# Patient Record
Sex: FEMALE | Race: WHITE | NOT HISPANIC OR LATINO | Employment: FULL TIME | URBAN - METROPOLITAN AREA
[De-identification: names, ages, dates, MRNs, and addresses within clinical notes are randomized per-mention and may not be internally consistent; named-entity substitution may affect disease eponyms.]

---
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
## 2018-06-05 ENCOUNTER — OFFICE VISIT (OUTPATIENT)
Dept: FAMILY MEDICINE CLINIC | Facility: CLINIC | Age: 18
End: 2018-06-05

## 2018-06-05 VITALS
TEMPERATURE: 98.5 F | HEART RATE: 97 BPM | OXYGEN SATURATION: 97 % | DIASTOLIC BLOOD PRESSURE: 70 MMHG | SYSTOLIC BLOOD PRESSURE: 110 MMHG | RESPIRATION RATE: 18 BRPM

## 2018-06-05 DIAGNOSIS — L70.0 ACNE VULGARIS: Primary | ICD-10-CM

## 2018-06-05 PROCEDURE — 99213 OFFICE O/P EST LOW 20 MIN: CPT | Performed by: INTERNAL MEDICINE

## 2018-06-05 RX ORDER — NORGESTIMATE AND ETHINYL ESTRADIOL 7DAYSX3 28
1 KIT ORAL DAILY
Qty: 28 TABLET | Refills: 12 | Status: SHIPPED | OUTPATIENT
Start: 2018-06-05 | End: 2019-04-23 | Stop reason: SDUPTHER

## 2018-11-21 ENCOUNTER — TELEPHONE (OUTPATIENT)
Dept: FAMILY MEDICINE CLINIC | Facility: CLINIC | Age: 18
End: 2018-11-21

## 2018-11-21 ENCOUNTER — OFFICE VISIT (OUTPATIENT)
Dept: FAMILY MEDICINE CLINIC | Facility: CLINIC | Age: 18
End: 2018-11-21

## 2018-11-21 VITALS
BODY MASS INDEX: 27.73 KG/M2 | OXYGEN SATURATION: 98 % | DIASTOLIC BLOOD PRESSURE: 70 MMHG | HEART RATE: 86 BPM | WEIGHT: 182.4 LBS | SYSTOLIC BLOOD PRESSURE: 124 MMHG | RESPIRATION RATE: 16 BRPM | TEMPERATURE: 98 F

## 2018-11-21 DIAGNOSIS — F41.9 ANXIETY: ICD-10-CM

## 2018-11-21 DIAGNOSIS — R63.5 WEIGHT GAIN: Primary | ICD-10-CM

## 2018-11-21 DIAGNOSIS — L50.9 URTICARIA: ICD-10-CM

## 2018-11-21 DIAGNOSIS — L70.0 ACNE VULGARIS: ICD-10-CM

## 2018-11-21 DIAGNOSIS — L67.8: ICD-10-CM

## 2018-11-21 PROCEDURE — 99213 OFFICE O/P EST LOW 20 MIN: CPT | Performed by: INTERNAL MEDICINE

## 2018-11-21 RX ORDER — PAROXETINE HYDROCHLORIDE HEMIHYDRATE 12.5 MG/1
12.5 TABLET, FILM COATED, EXTENDED RELEASE ORAL EVERY MORNING
Qty: 30 TABLET | Refills: 3 | Status: SHIPPED | OUTPATIENT
Start: 2018-11-21 | End: 2019-01-14 | Stop reason: SDUPTHER

## 2018-11-21 NOTE — PROGRESS NOTES
Subjective   Amy Brown is a 18 y.o. female who comes in today for   Chief Complaint   Patient presents with   • Itching   .    History of Present Illness   Mom here with pt who c/o hives at night when she is stressed and upset.  Pictures show red wheels which resemble urticaria.  Only occurs after a stressful event usually with her roommates.  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  +weight gain.  Acne and some hair growth adjacent to her ears bilaterally.  Mom is also concerned as is the patient about increase in anxiety.  She has been treated for anxiety and depression throughout her life but has never done well on any of them.  She has tried Zoloft, Celexa, Lexapro.  She has never tried Paxil.  Her periods are regular but she is on the birth control pill she has not had her first GYN exam.  There is a very strong family history in her mom grandmother and aunts have thyroid disease.  The following portions of the patient's history were reviewed and updated as appropriate: allergies, current medications, past family history, past medical history, past social history, past surgical history and problem list.    Review of Systems   Constitutional: Positive for unexpected weight change.   Skin: Positive for rash.   Psychiatric/Behavioral: The patient is nervous/anxious.        Vitals:    11/21/18 1100   BP: 124/70   Pulse: 86   Resp: 16   Temp: 98 °F (36.7 °C)   SpO2: 98%       Objective   Physical Exam   Constitutional: She is oriented to person, place, and time. She appears well-developed and well-nourished.   HENT:   Head: Normocephalic and atraumatic.   Right Ear: External ear normal.   Left Ear: External ear normal.   Mouth/Throat: Oropharynx is clear and moist.   Eyes: Conjunctivae are normal.   Neck: Neck supple.   Cardiovascular: Normal rate, regular rhythm and normal heart sounds.   No bruits   Pulmonary/Chest: Effort normal and breath sounds normal. No respiratory distress. She has no wheezes. She has no rales.    Abdominal: Soft. Bowel sounds are normal. She exhibits no distension and no mass. There is no tenderness.   Lymphadenopathy:     She has no cervical adenopathy.   Neurological: She is alert and oriented to person, place, and time.   Skin: Skin is warm.   Psychiatric: She has a normal mood and affect. Her behavior is normal. Judgment and thought content normal.   Nursing note and vitals reviewed.        Current Outpatient Medications:   •  norgestimate-ethinyl estradiol (ORTHO TRI-CYCLEN, 28,) 0.18/0.215/0.25 MG-35 MCG per tablet, Take 1 tablet by mouth Daily., Disp: 28 tablet, Rfl: 12  •  PARoxetine CR (PAXIL CR) 12.5 MG 24 hr tablet, Take 1 tablet by mouth Every Morning., Disp: 30 tablet, Rfl: 3    Assessment/Plan   Amy was seen today for itching.    Diagnoses and all orders for this visit:    Weight gain  -     T3, Free  -     T4, Free  -     TSH  -     Comprehensive Metabolic Panel  -     CBC & Differential  -     Testosterone  -     Hemoglobin A1c    Urticaria  -     T3, Free  -     T4, Free  -     TSH  -     Comprehensive Metabolic Panel  -     CBC & Differential  -     Testosterone  -     Hemoglobin A1c    Anxiety  -     T3, Free  -     T4, Free  -     TSH  -     Comprehensive Metabolic Panel  -     CBC & Differential  -     Testosterone  -     Hemoglobin A1c    Acne vulgaris  -     T3, Free  -     T4, Free  -     TSH  -     Comprehensive Metabolic Panel  -     CBC & Differential  -     Testosterone  -     Hemoglobin A1c    Abnormal hair growth from preauricular area towards lateral cheekbone  -     T3, Free  -     T4, Free  -     TSH  -     Comprehensive Metabolic Panel  -     CBC & Differential  -     Testosterone  -     Hemoglobin A1c    Other orders  -     PARoxetine CR (PAXIL CR) 12.5 MG 24 hr tablet; Take 1 tablet by mouth Every Morning.      Due to the acne, weight gain, abnormal hair growth I am going to do some lab work to screen for PCOS  I have started Paxil CR 12.5 once daily and advised her  on how to take this and any potential side effects.  We discussed that weight loss would actually improve her glucose control if that is indeed an issue.  I said a lot of time counseling her on ways to handle stress and actually encouraged her to see a counselor.  She declines at this time.  I will see her back in 8 weeks             I have asked for the patient to return to clinic in 6month(s).

## 2018-11-22 LAB
ALBUMIN SERPL-MCNC: 4.1 G/DL (ref 3.5–5.2)
ALBUMIN/GLOB SERPL: 1.2 G/DL
ALP SERPL-CCNC: 78 U/L (ref 39–117)
ALT SERPL-CCNC: 14 U/L (ref 1–33)
AST SERPL-CCNC: 18 U/L (ref 1–32)
BASOPHILS # BLD AUTO: 0.04 10*3/MM3 (ref 0–0.2)
BASOPHILS NFR BLD AUTO: 0.7 % (ref 0–1.5)
BILIRUB SERPL-MCNC: 0.3 MG/DL (ref 0.1–1.2)
BUN SERPL-MCNC: 8 MG/DL (ref 6–20)
BUN/CREAT SERPL: 10 (ref 7–25)
CALCIUM SERPL-MCNC: 9.7 MG/DL (ref 8.6–10.5)
CHLORIDE SERPL-SCNC: 103 MMOL/L (ref 98–107)
CO2 SERPL-SCNC: 22 MMOL/L (ref 22–29)
CREAT SERPL-MCNC: 0.8 MG/DL (ref 0.57–1)
EOSINOPHIL # BLD AUTO: 0.19 10*3/MM3 (ref 0–0.7)
EOSINOPHIL NFR BLD AUTO: 3.2 % (ref 0.3–6.2)
ERYTHROCYTE [DISTWIDTH] IN BLOOD BY AUTOMATED COUNT: 13.6 % (ref 11.7–13)
GLOBULIN SER CALC-MCNC: 3.5 GM/DL
GLUCOSE SERPL-MCNC: 88 MG/DL (ref 65–99)
HBA1C MFR BLD: 5.3 % (ref 4.8–5.6)
HCT VFR BLD AUTO: 40.8 % (ref 35.6–45.5)
HGB BLD-MCNC: 13.3 G/DL (ref 11.9–15.5)
IMM GRANULOCYTES # BLD: 0 10*3/MM3 (ref 0–0.03)
IMM GRANULOCYTES NFR BLD: 0 % (ref 0–0.5)
LYMPHOCYTES # BLD AUTO: 1.97 10*3/MM3 (ref 0.9–4.8)
LYMPHOCYTES NFR BLD AUTO: 32.8 % (ref 19.6–45.3)
MCH RBC QN AUTO: 30.2 PG (ref 26.9–32)
MCHC RBC AUTO-ENTMCNC: 32.6 G/DL (ref 32.4–36.3)
MCV RBC AUTO: 92.5 FL (ref 80.5–98.2)
MONOCYTES # BLD AUTO: 0.56 10*3/MM3 (ref 0.2–1.2)
MONOCYTES NFR BLD AUTO: 9.3 % (ref 5–12)
NEUTROPHILS # BLD AUTO: 3.24 10*3/MM3 (ref 1.9–8.1)
NEUTROPHILS NFR BLD AUTO: 54 % (ref 42.7–76)
PLATELET # BLD AUTO: 368 10*3/MM3 (ref 140–500)
POTASSIUM SERPL-SCNC: 4.5 MMOL/L (ref 3.5–5.2)
PROT SERPL-MCNC: 7.6 G/DL (ref 6–8.5)
RBC # BLD AUTO: 4.41 10*6/MM3 (ref 3.9–5.2)
SODIUM SERPL-SCNC: 141 MMOL/L (ref 136–145)
T3FREE SERPL-MCNC: 3.5 PG/ML (ref 2–4.4)
T4 FREE SERPL-MCNC: 1.06 NG/DL (ref 0.93–1.7)
TESTOST SERPL-MCNC: 28 NG/DL (ref 8–48)
TSH SERPL DL<=0.005 MIU/L-ACNC: 2.16 MIU/ML (ref 0.27–4.2)
WBC # BLD AUTO: 6 10*3/MM3 (ref 4.5–10.7)

## 2018-11-26 DIAGNOSIS — L67.8: ICD-10-CM

## 2018-11-26 DIAGNOSIS — R63.5 WEIGHT GAIN: Primary | ICD-10-CM

## 2019-01-02 ENCOUNTER — TELEPHONE (OUTPATIENT)
Dept: FAMILY MEDICINE CLINIC | Facility: CLINIC | Age: 19
End: 2019-01-02

## 2019-01-02 NOTE — TELEPHONE ENCOUNTER
Johanna from referred office states patient cancelled appt. With Dr. Finch And states she does not want to follow up with specialist at this time.

## 2019-01-09 ENCOUNTER — OFFICE VISIT (OUTPATIENT)
Dept: FAMILY MEDICINE CLINIC | Facility: CLINIC | Age: 19
End: 2019-01-09

## 2019-01-09 VITALS
OXYGEN SATURATION: 99 % | DIASTOLIC BLOOD PRESSURE: 68 MMHG | SYSTOLIC BLOOD PRESSURE: 120 MMHG | TEMPERATURE: 98.7 F | RESPIRATION RATE: 18 BRPM | WEIGHT: 182 LBS | HEART RATE: 83 BPM | BODY MASS INDEX: 29.25 KG/M2 | HEIGHT: 66 IN

## 2019-01-09 DIAGNOSIS — F41.9 ANXIETY: Primary | ICD-10-CM

## 2019-01-09 PROCEDURE — 99213 OFFICE O/P EST LOW 20 MIN: CPT | Performed by: INTERNAL MEDICINE

## 2019-01-09 NOTE — PROGRESS NOTES
Subjective   Amy Brown is a 18 y.o. female who comes in today for   Chief Complaint   Patient presents with   • Weight Gain     8 week follow up   .    History of Present Illness   Here for f/u on weight gain and hives.  Started paxil CR 12.5 mg qd.  Hives have stopped.  Only had one day of hives and that was her first day of working peak season for [employer].  Sleeping well.  Thinks her mood is more elevated and she is happier.  Feels like overall her anxiety has gotten a lot less.  Goes back to school next week.  Roommate situation is better as well.      The following portions of the patient's history were reviewed and updated as appropriate: allergies, current medications, past family history, past medical history, past social history, past surgical history and problem list.    Review of Systems   Constitutional: Negative.    Skin: Negative for rash.   Psychiatric/Behavioral: Negative.        Vitals:    01/09/19 1145   BP: 120/68   Pulse: 83   Resp: 18   Temp: 98.7 °F (37.1 °C)   SpO2: 99%       Objective   Physical Exam   Constitutional: She is oriented to person, place, and time. She appears well-developed and well-nourished.   HENT:   Head: Normocephalic and atraumatic.   Right Ear: External ear normal.   Left Ear: External ear normal.   Mouth/Throat: Oropharynx is clear and moist.   Eyes: Conjunctivae are normal.   Neck: Neck supple.   Cardiovascular: Normal rate, regular rhythm and normal heart sounds.   No bruits   Pulmonary/Chest: Effort normal and breath sounds normal. No respiratory distress. She has no wheezes. She has no rales.   Lymphadenopathy:     She has no cervical adenopathy.   Neurological: She is alert and oriented to person, place, and time.   Skin: Skin is warm. Capillary refill takes less than 2 seconds.   Psychiatric: She has a normal mood and affect. Her behavior is normal. Judgment and thought content normal.   Nursing note and vitals reviewed.        Current Outpatient Medications:   •   norgestimate-ethinyl estradiol (ORTHO TRI-CYCLEN, 28,) 0.18/0.215/0.25 MG-35 MCG per tablet, Take 1 tablet by mouth Daily., Disp: 28 tablet, Rfl: 12  •  PARoxetine CR (PAXIL CR) 12.5 MG 24 hr tablet, Take 1 tablet by mouth Every Morning., Disp: 30 tablet, Rfl: 3    Assessment/Plan   Amy was seen today for weight gain.    Diagnoses and all orders for this visit:    Anxiety      Continue paxil CR 12. 5mg qd. Working well and anxiety well controlled. No side effects.  Recheck in 6-9 mo             I have asked for the patient to return to clinic in 6month(s).

## 2019-01-14 ENCOUNTER — TELEPHONE (OUTPATIENT)
Dept: FAMILY MEDICINE CLINIC | Facility: CLINIC | Age: 19
End: 2019-01-14

## 2019-01-14 RX ORDER — PAROXETINE HYDROCHLORIDE HEMIHYDRATE 12.5 MG/1
12.5 TABLET, FILM COATED, EXTENDED RELEASE ORAL EVERY MORNING
Qty: 90 TABLET | Refills: 1 | Status: SHIPPED | OUTPATIENT
Start: 2019-01-14 | End: 2019-07-12 | Stop reason: SDUPTHER

## 2019-04-23 RX ORDER — NORGESTIMATE AND ETHINYL ESTRADIOL
KIT
Qty: 28 TABLET | Refills: 3 | Status: SHIPPED | OUTPATIENT
Start: 2019-04-23 | End: 2019-07-14 | Stop reason: SDUPTHER

## 2019-04-24 ENCOUNTER — TELEPHONE (OUTPATIENT)
Dept: FAMILY MEDICINE CLINIC | Facility: CLINIC | Age: 19
End: 2019-04-24

## 2019-04-24 NOTE — TELEPHONE ENCOUNTER
Pt has appointment 7/10/2019 Can she just have pap spear during that appointment? Or do you need pap spear sooner?

## 2019-04-25 NOTE — TELEPHONE ENCOUNTER
Pap smear in July is perfect!  As long as we get her pap smear sometime between her turning 19 and the end of the year it is fine

## 2019-07-15 RX ORDER — PAROXETINE HYDROCHLORIDE HEMIHYDRATE 12.5 MG/1
TABLET, FILM COATED, EXTENDED RELEASE ORAL
Qty: 90 TABLET | Refills: 1 | Status: SHIPPED | OUTPATIENT
Start: 2019-07-15 | End: 2023-02-08

## 2019-07-16 ENCOUNTER — OFFICE VISIT (OUTPATIENT)
Dept: FAMILY MEDICINE CLINIC | Facility: CLINIC | Age: 19
End: 2019-07-16

## 2019-07-16 VITALS
TEMPERATURE: 98.1 F | DIASTOLIC BLOOD PRESSURE: 84 MMHG | BODY MASS INDEX: 29.63 KG/M2 | WEIGHT: 184.4 LBS | HEART RATE: 71 BPM | OXYGEN SATURATION: 97 % | HEIGHT: 66 IN | SYSTOLIC BLOOD PRESSURE: 112 MMHG

## 2019-07-16 DIAGNOSIS — Z30.9 ENCOUNTER FOR CONTRACEPTIVE MANAGEMENT, UNSPECIFIED TYPE: ICD-10-CM

## 2019-07-16 DIAGNOSIS — F41.9 ANXIETY: Primary | ICD-10-CM

## 2019-07-16 PROCEDURE — 99213 OFFICE O/P EST LOW 20 MIN: CPT | Performed by: INTERNAL MEDICINE

## 2019-07-16 RX ORDER — NORGESTIMATE AND ETHINYL ESTRADIOL 7DAYSX3 28
1 KIT ORAL DAILY
Qty: 84 TABLET | Refills: 0 | Status: SHIPPED | OUTPATIENT
Start: 2019-07-16 | End: 2020-03-04

## 2019-07-16 RX ORDER — NORGESTIMATE AND ETHINYL ESTRADIOL 7DAYSX3 28
KIT ORAL
Qty: 84 TABLET | Refills: 1 | Status: SHIPPED | OUTPATIENT
Start: 2019-07-16 | End: 2019-07-16 | Stop reason: SDUPTHER

## 2019-07-16 RX ORDER — TRETINOIN 0.6 MG/G
GEL TOPICAL
COMMUNITY
Start: 2019-06-14

## 2019-07-16 NOTE — PROGRESS NOTES
Subjective   Amy Brown is a 19 y.o. female who comes in today for   Chief Complaint   Patient presents with   • Anxiety   .    History of Present Illness   Here for f/u on anxiety.  She is a rising senior at [employer].  Taking a summer class and working at [employer] as a .  Taking paxil CR 12.5 mg qd and it is helping.  Overall feels less anxious in the morning about things that usually upset her.  Helps her have reason and logic.  More engaged.  Has some days where she has low motivation.  That usually occurs on her offdays.  Hasn't had her first pap smear and prefers to come back in 3 mo for it  Cycles are regular.  Currently is not sexually active.    She needs refill on her BCP that she has previously started but had to stop it 3 mo ago b/c had run out of refills.  The following portions of the patient's history were reviewed and updated as appropriate: allergies, current medications, past family history, past medical history, past social history, past surgical history and problem list.    Review of Systems   Constitutional: Negative.    Psychiatric/Behavioral: Negative.        Vitals:    07/16/19 1055   BP: 112/84   Pulse: 71   Temp: 98.1 °F (36.7 °C)   SpO2: 97%       Objective   Physical Exam   Constitutional: She is oriented to person, place, and time. She appears well-developed and well-nourished.   HENT:   Head: Normocephalic and atraumatic.   Right Ear: External ear normal.   Left Ear: External ear normal.   Mouth/Throat: Oropharynx is clear and moist.   Eyes: Conjunctivae are normal.   Neck: Neck supple.   Cardiovascular: Normal rate, regular rhythm and normal heart sounds.   No bruits   Pulmonary/Chest: Effort normal and breath sounds normal. No respiratory distress. She has no wheezes. She has no rales.   Abdominal: Soft. Bowel sounds are normal. She exhibits no distension and no mass. There is no tenderness.   Lymphadenopathy:     She has no cervical adenopathy.   Neurological: She is alert and  oriented to person, place, and time.   Skin: Skin is warm.   Psychiatric: She has a normal mood and affect. Her behavior is normal. Judgment and thought content normal.   Nursing note and vitals reviewed.        Current Outpatient Medications:   •  norgestimate-ethinyl estradiol (TRI FEMYNOR) 0.18/0.215/0.25 MG-35 MCG per tablet, Take 1 tablet by mouth Daily., Disp: 84 tablet, Rfl: 0  •  PARoxetine CR (PAXIL-CR) 12.5 MG 24 hr tablet, TAKE 1 TABLET BY MOUTH EVERY DAY IN THE MORNING, Disp: 90 tablet, Rfl: 1  •  RETIN-A MICRO PUMP 0.06 % gel, , Disp: , Rfl:     Assessment/Plan   Amy was seen today for anxiety.    Diagnoses and all orders for this visit:    Anxiety    Encounter for contraceptive management, unspecified type    Other orders  -     norgestimate-ethinyl estradiol (TRI FEMYNOR) 0.18/0.215/0.25 MG-35 MCG per tablet; Take 1 tablet by mouth Daily.      Continue paxil CR 12.5 mg qd.  Tolerating well and feeling better  Reschedule for pap in 3 mo  Refill BCP             I have asked for the patient to return to clinic in 6month(s).

## 2020-03-04 RX ORDER — NORGESTIMATE AND ETHINYL ESTRADIOL 7DAYSX3 28
KIT ORAL
Qty: 84 TABLET | Refills: 0 | Status: SHIPPED | OUTPATIENT
Start: 2020-03-04 | End: 2020-05-26

## 2020-05-26 RX ORDER — NORGESTIMATE AND ETHINYL ESTRADIOL 7DAYSX3 28
KIT ORAL
Qty: 84 TABLET | Refills: 0 | Status: SHIPPED | OUTPATIENT
Start: 2020-05-26 | End: 2020-08-13

## 2020-06-22 ENCOUNTER — HOSPITAL ENCOUNTER (OUTPATIENT)
Dept: ULTRASOUND IMAGING | Facility: HOSPITAL | Age: 20
Discharge: HOME | End: 2020-06-22
Admitting: FAMILY MEDICINE

## 2020-06-22 ENCOUNTER — OFFICE VISIT (OUTPATIENT)
Dept: FAMILY MEDICINE CLINIC | Facility: CLINIC | Age: 20
End: 2020-06-22

## 2020-06-22 VITALS
DIASTOLIC BLOOD PRESSURE: 90 MMHG | HEIGHT: 66 IN | SYSTOLIC BLOOD PRESSURE: 116 MMHG | BODY MASS INDEX: 29.41 KG/M2 | OXYGEN SATURATION: 96 % | WEIGHT: 183 LBS | TEMPERATURE: 98.4 F | HEART RATE: 84 BPM

## 2020-06-22 DIAGNOSIS — R10.33 PERIUMBILICAL ABDOMINAL PAIN: Primary | ICD-10-CM

## 2020-06-22 DIAGNOSIS — R10.13 EPIGASTRIC PAIN: ICD-10-CM

## 2020-06-22 DIAGNOSIS — R93.5 ABDOMINAL ULTRASOUND, ABNORMAL: ICD-10-CM

## 2020-06-22 PROCEDURE — 99214 OFFICE O/P EST MOD 30 MIN: CPT | Performed by: FAMILY MEDICINE

## 2020-06-22 PROCEDURE — 76700 US EXAM ABDOM COMPLETE: CPT

## 2020-06-22 NOTE — PROGRESS NOTES
Subjective   Amy Brown is a 20 y.o. female.     Chief Complaint   Patient presents with   • Hernia     C/O OF PAIN AROUND UMBILICAL, HX OF HERNIAS, LIFTS AT WORK AND THINKS SHE MAY HAVE RUPTURED IT       History of Present Illness Amy Sweet a 20-year-old female patient came here with 1 day history of acute onset abdominal pain.  Patient works at [employer] and usually picks up heavy boxes.  Was moving some boxes yesterday and noticed something moved in her abdomen followed by pain.  It is describing as a constant pressure.  Also noticed a knot in her upper abdomen.  Had a bowel movement this morning denies any nausea vomiting.  Denies any fever.    Past Medical History:   Diagnosis Date   • ASD (atrial septal defect)     closed on its own at 1 year of age   • Hiatal hernia        No past surgical history on file.    Family History   Problem Relation Age of Onset   • No Known Problems Mother    • No Known Problems Father    • Hypertension Maternal Grandmother    • Hypertension Maternal Grandfather    • Cancer Paternal Grandmother    • COPD Paternal Grandfather        Social History     Socioeconomic History   • Marital status: Single     Spouse name: Not on file   • Number of children: Not on file   • Years of education: Not on file   • Highest education level: Not on file   Tobacco Use   • Smoking status: Never Smoker   • Smokeless tobacco: Never Used   Substance and Sexual Activity   • Alcohol use: Yes   • Drug use: No   • Sexual activity: Yes     Partners: Male     Birth control/protection: OCP, Condom       The following portions of the patient's history were reviewed and updated as appropriate: allergies, current medications, past family history, past medical history, past social history, past surgical history and problem list.    Review of Systems   Constitutional: Negative for activity change, appetite change, fatigue, fever, unexpected weight gain and unexpected weight loss.   HENT: Negative for  "congestion, ear pain, postnasal drip, rhinorrhea, sinus pressure and sore throat.    Eyes: Negative for blurred vision, discharge, itching and visual disturbance.   Respiratory: Negative for cough, chest tightness, shortness of breath and wheezing.    Cardiovascular: Negative for chest pain, palpitations and leg swelling.   Gastrointestinal: Positive for abdominal pain. Negative for constipation, diarrhea, nausea, vomiting and indigestion.   Musculoskeletal: Negative for arthralgias.   Neurological: Negative for headache.   Psychiatric/Behavioral: Negative for agitation, suicidal ideas, depressed mood and stress.           Objective   Vitals:    06/22/20 1558   BP: 116/90   Pulse: 84   Temp: 98.4 °F (36.9 °C)   SpO2: 96%   Weight: 83 kg (183 lb)   Height: 167.6 cm (66\")     Body mass index is 29.54 kg/m².  Physical Exam   Constitutional: She is oriented to person, place, and time. She appears well-developed and well-nourished. No distress.   HENT:   Head: Normocephalic and atraumatic.   Mouth/Throat: Oropharynx is clear and moist.   Eyes: Pupils are equal, round, and reactive to light. EOM are normal. Right eye exhibits no discharge. Left eye exhibits no discharge.   Neck: Normal range of motion. Neck supple.   Cardiovascular: Normal rate, regular rhythm and normal heart sounds.   Pulmonary/Chest: Effort normal and breath sounds normal. She has no wheezes. She has no rales.   Abdominal: Soft. Bowel sounds are normal. She exhibits no distension, no abdominal bruit, no pulsatile midline mass and no mass. There is tenderness. There is no guarding. No hernia.   There is gapping felt around umblicus   Musculoskeletal: She exhibits no edema.   Lymphadenopathy:     She has no cervical adenopathy.   Neurological: She is alert and oriented to person, place, and time.     Assessment/Plan   Problem List Items Addressed This Visit     None      Visit Diagnoses     Periumbilical abdominal pain    -  Primary    Relevant Orders    " US Abdomen Complete (Completed)    Epigastric pain        Relevant Orders    US Abdomen Complete (Completed)    Abdominal ultrasound, abnormal          Amy Brown is a 20-year-old patient of Dr. Patel came here with 1 day history of acute onset abdominal pain and swelling.  I did examine her in a standing position and lying down did not noticed any obvious swelling.   she was  tender on periumbilical area and epigastric area.  There is slight gapping in the periumbilical area.  I will do abdominal ultrasound stat.  Also given instruction patient not to  any weight.  I will call her with ultrasound results.    Addendum  I called patient back with her ultrasound result.  Per radiologist there is a ovoid structure with pain abdominal wall and the skin surface, could represent umbilical hernia.  CT scan recommended.  I also instructed to patient to take it easy to try to keep ice.  Also instructed to go to emergency room if if she started having nausea vomiting or unable to pass flatus.  Patient is scheduled for CT scan.      Return if symptoms worsen or fail to improve, after ultrasound.

## 2020-06-23 ENCOUNTER — HOSPITAL ENCOUNTER (OUTPATIENT)
Dept: CT IMAGING | Facility: HOSPITAL | Age: 20
Discharge: HOME | End: 2020-06-23
Admitting: FAMILY MEDICINE

## 2020-06-23 DIAGNOSIS — R10.13 EPIGASTRIC PAIN: ICD-10-CM

## 2020-06-23 DIAGNOSIS — R10.33 PERIUMBILICAL ABDOMINAL PAIN: ICD-10-CM

## 2020-06-23 DIAGNOSIS — R93.5 ABDOMINAL ULTRASOUND, ABNORMAL: ICD-10-CM

## 2020-06-23 PROCEDURE — 74176 CT ABD & PELVIS W/O CONTRAST: CPT

## 2020-06-23 PROCEDURE — 0 DIATRIZOATE MEGLUMINE & SODIUM PER 1 ML: Performed by: FAMILY MEDICINE

## 2020-06-23 RX ADMIN — DIATRIZOATE MEGLUMINE AND DIATRIZOATE SODIUM 30 ML: 660; 100 LIQUID ORAL; RECTAL at 14:35

## 2020-06-24 ENCOUNTER — TELEPHONE (OUTPATIENT)
Dept: FAMILY MEDICINE CLINIC | Facility: CLINIC | Age: 20
End: 2020-06-24

## 2020-06-24 NOTE — TELEPHONE ENCOUNTER
Call pt and ct scan results discussed with her over phone 6/23/20  No hernia seen on ct.  Her pain has also improved.  I advised her to have fup with dr costello in 2  wk /or as needed. She will call office to schedule appointment with Dr costello.

## 2020-07-24 ENCOUNTER — TELEPHONE (OUTPATIENT)
Dept: FAMILY MEDICINE CLINIC | Facility: CLINIC | Age: 20
End: 2020-07-24

## 2020-07-24 NOTE — TELEPHONE ENCOUNTER
Spoke to patient and informed her that since she has had direct exposure she would need to go to the urgent care to be evaluated and tested if ordered.  Also, told her that if she did not go get the test she would need to quarantine for 14 days since she was around her father.  She expressed understanding

## 2020-07-24 NOTE — TELEPHONE ENCOUNTER
PATIENT CALLED AND STATES HER FATHER HAS TESTED POSITIVE 4 DAYS AGO AND GOT HIS RESULTS TODAY. SHE IS NOT SHOWING ANY SYMPTOMS AT THIS POINT. SHE SAW HIM A WEEK AGO Saturday.    SHOULD SHE GET TESTED IN ORDER FOR HER TO GO BACK TO WORK FASTER.. PLEASE CALL AND ADVISE [phone]

## 2020-08-13 RX ORDER — NORGESTIMATE AND ETHINYL ESTRADIOL 7DAYSX3 28
KIT ORAL
Qty: 84 TABLET | Refills: 0 | Status: SHIPPED | OUTPATIENT
Start: 2020-08-13 | End: 2020-11-03

## 2020-09-01 ENCOUNTER — OFFICE VISIT (OUTPATIENT)
Dept: FAMILY MEDICINE CLINIC | Facility: CLINIC | Age: 20
End: 2020-09-01

## 2020-09-01 VITALS
OXYGEN SATURATION: 98 % | DIASTOLIC BLOOD PRESSURE: 74 MMHG | SYSTOLIC BLOOD PRESSURE: 108 MMHG | TEMPERATURE: 98.5 F | WEIGHT: 181.2 LBS | RESPIRATION RATE: 16 BRPM | BODY MASS INDEX: 29.12 KG/M2 | HEART RATE: 72 BPM | HEIGHT: 66 IN

## 2020-09-01 DIAGNOSIS — R93.89 ABNORMAL FINDING OF DIAGNOSTIC IMAGING: ICD-10-CM

## 2020-09-01 DIAGNOSIS — Z01.419 ENCOUNTER FOR ROUTINE GYNECOLOGICAL EXAMINATION WITH PAPANICOLAOU SMEAR OF CERVIX: Primary | ICD-10-CM

## 2020-09-01 DIAGNOSIS — N89.8 VAGINAL DISCHARGE: ICD-10-CM

## 2020-09-01 PROCEDURE — 99214 OFFICE O/P EST MOD 30 MIN: CPT | Performed by: INTERNAL MEDICINE

## 2020-09-01 RX ORDER — FLUCONAZOLE 150 MG/1
150 TABLET ORAL ONCE
Qty: 1 TABLET | Refills: 0 | Status: SHIPPED | OUTPATIENT
Start: 2020-09-01 | End: 2020-09-01

## 2020-09-01 NOTE — PROGRESS NOTES
"Subjective  Answers for HPI/ROS submitted by the patient on 8/25/2020   What is the primary reason for your visit?: Physical    Amy Brown is a 20 y.o. female who comes in today for   Chief Complaint   Patient presents with   • Follow-up      ct    .    History of Present Illness   Here to f/u on u/s and CT abd 6/2020 that was done after seeing Dr. Wells due to abdominal pain following moving heavy boxes at [employer] where she works.  U/s suggested umbilical hernia and therefore a few days later a CT was done that showed thickening of the skin in the umbilicus c/w cellulitis.  She was actually sent to the ER from my office where they did the initial ultrasound.  They thought it was a pulled muscle.  The umbilicus looked normal per the patient and she was never put on antibiotic or any kind of topical cream .  She quickly got better and never had any recurrence of abdominal pain or symptoms in the umbilicus.  She is on birth control.  She is not sexually active currently and her last partner was 18 months ago approximately.  She denies vaginal discharge but she does have a little burning at times.  She is never had a Pap smear.  She is due for her first Pap smear based on her age.  The following portions of the patient's history were reviewed and updated as appropriate: allergies, current medications, past family history, past medical history, past social history, past surgical history and problem list.    Review of Systems    /74   Pulse 72   Temp 98.5 °F (36.9 °C) (Temporal)   Resp 16   Ht 167.6 cm (66\")   Wt 82.2 kg (181 lb 3.2 oz)   LMP 08/18/2020 (Approximate)   SpO2 98%   BMI 29.25 kg/m²     STEADI Fall Risk Assessment has not been completed.    PHQ-2/PHQ-9 Depression Screening 9/1/2020   Little interest or pleasure in doing things 0   Feeling down, depressed, or hopeless 0   Total Score 0       Objective   Physical Exam   Constitutional: She is oriented to person, place, and time. She " appears well-developed and well-nourished.   HENT:   Head: Normocephalic and atraumatic.   Eyes: Conjunctivae are normal.   Cardiovascular: Normal rate, regular rhythm and normal heart sounds.   Pulmonary/Chest: Effort normal and breath sounds normal. Right breast exhibits no inverted nipple, no mass, no nipple discharge, no skin change and no tenderness. Left breast exhibits no inverted nipple, no mass, no nipple discharge, no skin change and no tenderness.   Abdominal: Soft. Bowel sounds are normal. There is no tenderness. No hernia.   Skin at the umbilicus is totally  Normal and no redness or thickening  No mass and no hernia   Genitourinary: Rectum normal and vagina normal. Rectal exam shows guaiac negative stool. Pelvic exam was performed with patient in the knee-chest position. No labial fusion. There is no rash, tenderness, lesion or injury on the right labia. There is no rash, tenderness, lesion or injury on the left labia. Uterus is not deviated, not enlarged, not fixed and not tender. Cervix exhibits no motion tenderness, no discharge and no friability. Right adnexum displays no mass, no tenderness and no fullness. Left adnexum displays no mass, no tenderness and no fullness.   Neurological: She is alert and oriented to person, place, and time.   Skin: Skin is warm and dry.   Psychiatric: She has a normal mood and affect. Her behavior is normal. Judgment and thought content normal.   Nursing note and vitals reviewed.        Current Outpatient Medications:   •  fluconazole (Diflucan) 150 MG tablet, Take 1 tablet by mouth 1 (One) Time for 1 dose., Disp: 1 tablet, Rfl: 0  •  PARoxetine CR (PAXIL-CR) 12.5 MG 24 hr tablet, TAKE 1 TABLET BY MOUTH EVERY DAY IN THE MORNING, Disp: 90 tablet, Rfl: 1  •  RETIN-A MICRO PUMP 0.06 % gel, , Disp: , Rfl:   •  TRI FEMYNOR 0.18/0.215/0.25 MG-35 MCG per tablet, TAKE 1 TABLET BY MOUTH EVERY DAY, Disp: 84 tablet, Rfl: 0    Assessment/Plan   Amy was seen today for  follow-up.    Diagnoses and all orders for this visit:    Encounter for routine gynecological examination with Papanicolaou smear of cervix  -     Pap IG, Rfx HPV ASCU; Future  -     Pap IG, Rfx HPV ASCU    Vaginal discharge  -     NuSwab VG+ & HSV    Abnormal finding of diagnostic imaging    Other orders  -     fluconazole (Diflucan) 150 MG tablet; Take 1 tablet by mouth 1 (One) Time for 1 dose.    I have reviewed both the abdominal ultrasound and abdominal pelvic CT scan with the patient and gone over all the findings which were essentially normal other than the skin thickening at the umbilicus.  That has since resolved and we concluded that what happened was there was some trauma to the skin in the area of the umbilicus as she was pushing a very heavy object or box at work with her upper extremities and using her torso and abdomen to push the box.  And result is she is totally normal now with no subsequent findings or concerns.  She is due for her first Pap smear and we have done a ThinPrep today.  She will continue on the birth control pills which she is tolerating well.  I did do a new swab vaginitis panel due to what appears to be yeasty vaginal discharge.  I am also going to send in a Diflucan for her.               I have asked for the patient to return to clinic in 12month(s).

## 2020-09-03 LAB
CONV .: NORMAL
CYTOLOGIST CVX/VAG CYTO: NORMAL
CYTOLOGY CVX/VAG DOC CYTO: NORMAL
CYTOLOGY CVX/VAG DOC THIN PREP: NORMAL
DX ICD CODE: NORMAL
HIV 1 & 2 AB SER-IMP: NORMAL
OTHER STN SPEC: NORMAL
STAT OF ADQ CVX/VAG CYTO-IMP: NORMAL

## 2020-09-05 LAB
A VAGINAE DNA VAG QL NAA+PROBE: NORMAL SCORE
BVAB2 DNA VAG QL NAA+PROBE: NORMAL SCORE
C ALBICANS DNA VAG QL NAA+PROBE: NEGATIVE
C GLABRATA DNA VAG QL NAA+PROBE: NEGATIVE
C TRACH DNA VAG QL NAA+PROBE: NEGATIVE
HSV1 DNA SPEC QL NAA+PROBE: NEGATIVE
HSV2 DNA SPEC QL NAA+PROBE: NEGATIVE
MEGA1 DNA VAG QL NAA+PROBE: NORMAL SCORE
N GONORRHOEA DNA VAG QL NAA+PROBE: NEGATIVE
T VAGINALIS DNA VAG QL NAA+PROBE: NEGATIVE

## 2020-11-03 RX ORDER — NORGESTIMATE AND ETHINYL ESTRADIOL 7DAYSX3 28
KIT ORAL
Qty: 84 TABLET | Refills: 0 | Status: SHIPPED | OUTPATIENT
Start: 2020-11-03 | End: 2021-06-29 | Stop reason: SDUPTHER

## 2021-03-03 ENCOUNTER — TELEPHONE (OUTPATIENT)
Dept: FAMILY MEDICINE CLINIC | Facility: CLINIC | Age: 21
End: 2021-03-03

## 2021-03-03 DIAGNOSIS — S69.92XA INJURY OF LEFT WRIST, INITIAL ENCOUNTER: Primary | ICD-10-CM
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
## 2021-04-01 ENCOUNTER — TELEPHONE (OUTPATIENT)
Dept: FAMILY MEDICINE CLINIC | Facility: CLINIC | Age: 21
End: 2021-04-01
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
## 2021-04-02 NOTE — TELEPHONE ENCOUNTER
Since we do not know what type of cyst it is (imaging is not in chart), ENT is the next step in evaluation.

## 2021-04-05 NOTE — TELEPHONE ENCOUNTER
Mother has been called on she is on HIPAA she has been advised of  response and gave verbal understanding.     Pt has been called and LMVM and advised to either reach out to us or her mother in order to get response.

## 2021-04-16 ENCOUNTER — BULK ORDERING (OUTPATIENT)
Dept: CASE MANAGEMENT | Facility: OTHER | Age: 21
End: 2021-04-16

## 2021-04-16 DIAGNOSIS — Z23 IMMUNIZATION DUE: ICD-10-CM

## 2021-06-29 ENCOUNTER — OFFICE VISIT (OUTPATIENT)
Dept: FAMILY MEDICINE CLINIC | Facility: CLINIC | Age: 21
End: 2021-06-29

## 2021-06-29 ENCOUNTER — TELEPHONE (OUTPATIENT)
Dept: FAMILY MEDICINE CLINIC | Facility: CLINIC | Age: 21
End: 2021-06-29

## 2021-06-29 VITALS
DIASTOLIC BLOOD PRESSURE: 86 MMHG | SYSTOLIC BLOOD PRESSURE: 136 MMHG | RESPIRATION RATE: 16 BRPM | WEIGHT: 189.6 LBS | TEMPERATURE: 97.3 F | HEART RATE: 64 BPM | HEIGHT: 66 IN | BODY MASS INDEX: 30.47 KG/M2 | OXYGEN SATURATION: 94 %

## 2021-06-29 DIAGNOSIS — Z30.8 ENCOUNTER FOR OTHER CONTRACEPTIVE MANAGEMENT: ICD-10-CM

## 2021-06-29 DIAGNOSIS — G44.52 NEW DAILY PERSISTENT HEADACHE: Primary | ICD-10-CM

## 2021-06-29 PROCEDURE — 99214 OFFICE O/P EST MOD 30 MIN: CPT | Performed by: INTERNAL MEDICINE

## 2021-06-29 RX ORDER — NORGESTIMATE AND ETHINYL ESTRADIOL 7DAYSX3 28
1 KIT ORAL DAILY
Qty: 84 TABLET | Refills: 0 | Status: SHIPPED | OUTPATIENT
Start: 2021-06-29 | End: 2021-09-27 | Stop reason: SDUPTHER

## 2021-06-29 RX ORDER — NORGESTIMATE AND ETHINYL ESTRADIOL 7DAYSX3 28
KIT ORAL
Qty: 84 TABLET | Refills: 0 | OUTPATIENT
Start: 2021-06-29

## 2021-06-29 RX ORDER — TOPIRAMATE 25 MG/1
25 TABLET ORAL
Qty: 90 TABLET | Refills: 0 | Status: SHIPPED | OUTPATIENT
Start: 2021-06-29 | End: 2021-07-29 | Stop reason: SDUPTHER

## 2021-06-29 NOTE — TELEPHONE ENCOUNTER
Pt called and appointment type changed.     ----- Message from Brenda Patel MD sent at 6/29/2021  5:45 AM EDT -----  I happened to check date of her last pap and it was 9/2020 therefore we can't do her pap today unless she has new insurance.  She has a CPE with pap at 1 pm today.  Please call patient and find out if ok to proceed with CPE and pap today.  I can refill her OCP till her appt in Sept or Oct if we need to change it.  If she cancels today, I have a few patients to work into that time slot.
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
## 2021-07-29 RX ORDER — TOPIRAMATE 50 MG/1
50 TABLET, FILM COATED ORAL
Qty: 90 TABLET | Refills: 0 | Status: SHIPPED | OUTPATIENT
Start: 2021-07-29 | End: 2021-10-26

## 2021-08-08 NOTE — PROGRESS NOTES
Subjective   Amy Brown is a 17 y.o. female who comes in today for   Chief Complaint   Patient presents with   • discuss birth control     never had pap smear    .    History of Present Illness   Here to discuss BCP.  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  Recently saw derm and they want to restart acutane which she took for 7-8 mo in high school.  Next appt with derm is June 25 and they want her to get back on bcp.  Took one in high school for a year and had no problems while taking bcp.  No DVT hx or fhx of DVT.  Paternal GM had breast cancer in her 70's .  Her cycles are regular and monthly and aren't too heavy.   No smoking of tobacco or weed.   Not sexually active.    The following portions of the patient's history were reviewed and updated as appropriate: allergies, current medications, past family history, past medical history, past social history, past surgical history and problem list.    Review of Systems   Constitutional: Negative.    Skin:        Acne flare and wanting to restart accutane per derm       Vitals:    06/05/18 1423   BP: 110/70   Pulse: 97   Resp: 18   Temp: 98.5 °F (36.9 °C)   SpO2: 97%       Objective   Physical Exam   Constitutional: She is oriented to person, place, and time. She appears well-developed and well-nourished.   HENT:   Head: Normocephalic and atraumatic.   Right Ear: External ear normal.   Left Ear: External ear normal.   Mouth/Throat: Oropharynx is clear and moist.   Eyes: Conjunctivae are normal.   Neck: Neck supple.   Cardiovascular: Normal rate, regular rhythm and normal heart sounds.    No bruits   Pulmonary/Chest: Effort normal and breath sounds normal. No respiratory distress. She has no wheezes. She has no rales.   Abdominal: Soft. Bowel sounds are normal. She exhibits no distension and no mass. There is no tenderness.   Lymphadenopathy:     She has no  cervical adenopathy.   Neurological: She is alert and oriented to person, place, and time.   Skin: Skin is warm.   Psychiatric: She has a normal mood and affect. Her behavior is normal. Judgment and thought content normal.   Nursing note and vitals reviewed.      Assessment/Plan   Amy was seen today for discuss birth control.    Diagnoses and all orders for this visit:    Acne vulgaris      Start ortho tricyclen for BCP while on accutane  Side effects discussed with patient.  Risks and benefits discussed and also how to take the pill and how to start the pill  Safe sex and the use of condoms also discussed.               I have asked for the patient to return to clinic in 6month(s).   1

## 2021-09-09 ENCOUNTER — OFFICE VISIT (OUTPATIENT)
Dept: FAMILY MEDICINE CLINIC | Facility: CLINIC | Age: 21
End: 2021-09-09

## 2021-09-09 VITALS
HEART RATE: 94 BPM | SYSTOLIC BLOOD PRESSURE: 122 MMHG | TEMPERATURE: 97.5 F | DIASTOLIC BLOOD PRESSURE: 80 MMHG | HEIGHT: 66 IN | OXYGEN SATURATION: 99 % | RESPIRATION RATE: 16 BRPM | BODY MASS INDEX: 29.57 KG/M2 | WEIGHT: 184 LBS

## 2021-09-09 DIAGNOSIS — B34.9 ACUTE VIRAL SYNDROME: Primary | ICD-10-CM

## 2021-09-09 PROCEDURE — 99213 OFFICE O/P EST LOW 20 MIN: CPT | Performed by: NURSE PRACTITIONER

## 2021-09-09 NOTE — PATIENT INSTRUCTIONS
Discharge instructions  Push fluids  Plenty rest  Anything over-the-counter as needed such as Chloraseptic spray for sore throat etc. Robitussin-DM for cough congestion ibuprofen 800 mg 3 times a day for body aches or fever    Hydration hydration hydration should you become short of breath urgent care or ER mild dyspnea urgent recheck or urgent care for dyspnea at rest difficulty speaking severe fatigue high fever weakness emergency room.    Okay to get your vaccine this weekend as long as you are without febrile illness and your Covid test was negative      Off work, quarantine until symptom-free for at least 3 days no work

## 2021-09-09 NOTE — PROGRESS NOTES
"Chief Complaint  Fever, Chills, Nasal Congestion, Cough, and Fatigue    Subjective          Amy Brown presents to [facility]  Pleasant patient here today complains of sinus drainage some fatigue feverish nasal congestion slight cough without chest pain shortness of breath loss of taste or smell.  Presents some mild myalgias, no severe symptoms  No headache.  She is not been vaccinated Covid yet but she plans on getting it this weekend she went to wait for safety concern    Social history no tobacco abuse,  Employed full-time, making good decisions          Objective   Vital Signs:   /80   Pulse 94   Temp 97.5 °F (36.4 °C) (Infrared)   Resp 16   Ht 167.6 cm (66\")   Wt 83.5 kg (184 lb) Comment: PT GAVE ESTIMATE  SpO2 99%   BMI 29.70 kg/m²     Physical Exam  Vitals reviewed.   Constitutional:       General: She is not in acute distress.     Appearance: Normal appearance. She is well-developed. She is not ill-appearing, toxic-appearing or diaphoretic.      Comments: Pleasant no distress   HENT:      Head: Normocephalic.      Comments: Tonsils 1+ bilateral without redness or erythema this is likely her baseline uvula midline speech is clear tongue midline  No cervical adenopathy neck is supple.     Nose: Nose normal.   Eyes:      General: No scleral icterus.     Conjunctiva/sclera: Conjunctivae normal.      Pupils: Pupils are equal, round, and reactive to light.   Neck:      Thyroid: No thyromegaly.      Vascular: No JVD.   Cardiovascular:      Rate and Rhythm: Normal rate and regular rhythm.      Heart sounds: Normal heart sounds. No murmur heard.   No friction rub. No gallop.    Pulmonary:      Effort: Pulmonary effort is normal. No respiratory distress.      Breath sounds: Normal breath sounds. No stridor. No wheezing or rales.      Comments: Chest clear throughout no inspiratory crackles respirations less than 20 patient is unlabored able to speak full sentences without " dyspnea.  Abdominal:      Palpations: Abdomen is soft.      Comments: No hepatosplenomegaly, no ascites,   Musculoskeletal:         General: No tenderness.      Cervical back: Neck supple.   Lymphadenopathy:      Cervical: No cervical adenopathy.   Skin:     General: Skin is warm and dry.      Capillary Refill: Capillary refill takes less than 2 seconds.      Findings: No erythema or rash.      Comments: Normal capillary perfusion   Neurological:      Mental Status: She is alert and oriented to person, place, and time.      Deep Tendon Reflexes: Reflexes are normal and symmetric.   Psychiatric:         Mood and Affect: Mood normal.         Behavior: Behavior normal.         Thought Content: Thought content normal.         Judgment: Judgment normal.        Result Review :                 Assessment and Plan    Diagnoses and all orders for this visit:    1. Acute viral syndrome (Primary)        Follow Up   No follow-ups on file.  Patient was given instructions and counseling regarding her condition or for health maintenance advice. Please see specific information pulled into the AVS if appropriate.     Symptoms are suspicious for Covid or Covid-like viral illness  Push fluids plenty rest quarantine 10 days symptomatic free for 3  Should she have high fever chest pain shortness of breath lethargy weakness emergency room  A negative Covid test does not rule out Covid  She will send me a message Monday for update on her condition and I will follow her until she is well  Follow-up Dr. Patel for routine care  Ibuprofen 800  3 times a day as needed for fever body aches

## 2021-09-10 LAB
LABCORP SARS-COV-2, NAA 2 DAY TAT: NORMAL
SARS-COV-2 RNA RESP QL NAA+PROBE: NOT DETECTED

## 2021-09-20 RX ORDER — TOPIRAMATE 25 MG/1
TABLET ORAL
Qty: 90 TABLET | Refills: 0 | OUTPATIENT
Start: 2021-09-20

## 2021-09-20 NOTE — TELEPHONE ENCOUNTER
Rx Refill Note  Requested Prescriptions     Pending Prescriptions Disp Refills   • topiramate (TOPAMAX) 25 MG tablet [Pharmacy Med Name: TOPIRAMATE 25 MG TABLET] 90 tablet 0     Sig: TAKE 1 TABLET BY MOUTH EVERYDAY AT BEDTIME      Last office visit with prescribing clinician: 6/29/2021      Next office visit with prescribing clinician: Visit date not found       {TIP  Please add Last Relevant Lab Date if appropriate: 11/21/18    Leslye Ferreira MA  09/20/21, 09:33 EDT

## 2021-09-27 RX ORDER — NORGESTIMATE AND ETHINYL ESTRADIOL 7DAYSX3 28
1 KIT ORAL DAILY
Qty: 84 TABLET | Refills: 1 | Status: SHIPPED | OUTPATIENT
Start: 2021-09-27 | End: 2022-03-21

## 2021-10-25 NOTE — TELEPHONE ENCOUNTER
Rx Refill Note  Requested Prescriptions     Pending Prescriptions Disp Refills   • topiramate (TOPAMAX) 50 MG tablet [Pharmacy Med Name: TOPIRAMATE 50 MG TABLET] 90 tablet 0     Sig: TAKE 1 TABLET BY MOUTH EVERYDAY AT BEDTIME      Last office visit with prescribing clinician: 6/29/2021 9/9/24 [place]      Next office visit with prescribing clinician: Visit date not found       {TIP  Please add Last Relevant Lab Date if appropriate: 11/21/18    Leslye Ferreira MA  10/25/21, 08:35 EDT

## 2021-10-26 ENCOUNTER — OFFICE VISIT (OUTPATIENT)
Dept: FAMILY MEDICINE CLINIC | Facility: CLINIC | Age: 21
End: 2021-10-26

## 2021-10-26 VITALS
DIASTOLIC BLOOD PRESSURE: 84 MMHG | HEART RATE: 83 BPM | WEIGHT: 190.7 LBS | SYSTOLIC BLOOD PRESSURE: 112 MMHG | RESPIRATION RATE: 16 BRPM | TEMPERATURE: 95.7 F | BODY MASS INDEX: 30.65 KG/M2 | HEIGHT: 66 IN | OXYGEN SATURATION: 98 %

## 2021-10-26 DIAGNOSIS — R10.84 GENERALIZED ABDOMINAL PAIN: Primary | ICD-10-CM

## 2021-10-26 DIAGNOSIS — F41.9 ANXIETY: ICD-10-CM

## 2021-10-26 PROCEDURE — 99213 OFFICE O/P EST LOW 20 MIN: CPT | Performed by: NURSE PRACTITIONER

## 2021-10-26 RX ORDER — PANTOPRAZOLE SODIUM 40 MG/1
40 TABLET, DELAYED RELEASE ORAL DAILY
Qty: 30 TABLET | Refills: 1 | Status: SHIPPED | OUTPATIENT
Start: 2021-10-26 | End: 2021-11-19

## 2021-10-26 RX ORDER — TOPIRAMATE 50 MG/1
TABLET, FILM COATED ORAL
Qty: 90 TABLET | Refills: 0 | Status: SHIPPED | OUTPATIENT
Start: 2021-10-26 | End: 2022-01-18

## 2021-10-26 NOTE — PATIENT INSTRUCTIONS
Pantoprazole tablets  What is this medicine?  PANTOPRAZOLE (pan TOE pra zole) prevents the production of acid in the stomach. It is used to treat gastroesophageal reflux disease (GERD), inflammation of the esophagus, and Zollinger-Chu syndrome.  This medicine may be used for other purposes; ask your health care provider or pharmacist if you have questions.  COMMON BRAND NAME(S): Protonix  What should I tell my health care provider before I take this medicine?  They need to know if you have any of these conditions:  · liver disease  · low levels of magnesium in the blood  · lupus  · an unusual or allergic reaction to omeprazole, lansoprazole, pantoprazole, rabeprazole, other medicines, foods, dyes, or preservatives  · pregnant or trying to get pregnant  · breast-feeding  How should I use this medicine?  Take this medicine by mouth. Swallow the tablets whole with a drink of water. Follow the directions on the prescription label. Do not crush, break, or chew. Take your medicine at regular intervals. Do not take your medicine more often than directed.  Talk to your pediatrician regarding the use of this medicine in children. While this drug may be prescribed for children as young as 5 years for selected conditions, precautions do apply.  Overdosage: If you think you have taken too much of this medicine contact a poison control center or emergency room at once.  NOTE: This medicine is only for you. Do not share this medicine with others.  What if I miss a dose?  If you miss a dose, take it as soon as you can. If it is almost time for your next dose, take only that dose. Do not take double or extra doses.  What may interact with this medicine?  Do not take this medicine with any of the following medications:  · atazanavir  · nelfinavir  This medicine may also interact with the following medications:  · ampicillin  · delavirdine  · erlotinib  · iron salts  · medicines for fungal infections like ketoconazole,  itraconazole and voriconazole  · methotrexate  · mycophenolate mofetil  · warfarin  This list may not describe all possible interactions. Give your health care provider a list of all the medicines, herbs, non-prescription drugs, or dietary supplements you use. Also tell them if you smoke, drink alcohol, or use illegal drugs. Some items may interact with your medicine.  What should I watch for while using this medicine?  It can take several days before your stomach pain gets better. Check with your doctor or health care provider if your condition does not start to get better, or if it gets worse.  This medicine may cause serious skin reactions. They can happen weeks to months after starting the medicine. Contact your health care provider right away if you notice fevers or flu-like symptoms with a rash. The rash may be red or purple and then turn into blisters or peeling of the skin. Or, you might notice a red rash with swelling of the face, lips or lymph nodes in your neck or under your arms.  You may need blood work done while you are taking this medicine.  This medicine may cause a decrease in vitamin B12. You should make sure that you get enough vitamin B12 while you are taking this medicine. Discuss the foods you eat and the vitamins you take with your health care provider.  What side effects may I notice from receiving this medicine?  Side effects that you should report to your doctor or health care professional as soon as possible:  ·   allergic reactions like skin rash, itching or hives, swelling of the face, lips, or tongue  ·   bone, muscle or joint pain  ·   breathing problems  ·   chest pain or chest tightness  ·   dark yellow or brown urine  ·   dizziness  ·   fast, irregular heartbeat  ·   feeling faint or lightheaded  ·   fever or sore throat  ·   muscle spasm  ·   palpitations  ·   rash on cheeks or arms that gets worse in the sun  ·   redness, blistering, peeling or loosening of the skin, including  inside the mouth  ·   seizures  · stomach polyps  ·   tremors  ·   unusual bleeding or bruising  ·   unusually weak or tired  ·   yellowing of the eyes or skin  Side effects that usually do not require medical attention (report to your doctor or health care professional if they continue or are bothersome):  ·   constipation  ·   diarrhea  ·   dry mouth  ·   headache  ·   nausea  This list may not describe all possible side effects. Call your doctor for medical advice about side effects. You may report side effects to FDA at 2-957-NSD-2975.  Where should I keep my medicine?  Keep out of the reach of children.  Store at room temperature between 15 and 30 degrees C (59 and 86 degrees F). Protect from light and moisture. Throw away any unused medicine after the expiration date.  NOTE: This sheet is a summary. It may not cover all possible information. If you have questions about this medicine, talk to your doctor, pharmacist, or health care provider.  © 2021 [employer] (2020-03-27 13:18:32)

## 2021-11-18 NOTE — TELEPHONE ENCOUNTER
Rx Refill Note  Requested Prescriptions     Pending Prescriptions Disp Refills   • pantoprazole (PROTONIX) 40 MG EC tablet [Pharmacy Med Name: PANTOPRAZOLE SOD DR 40 MG TAB] 30 tablet 1     Sig: TAKE 1 TABLET BY MOUTH EVERY DAY      Last office visit with prescribing clinician: 10/26/2021      Next office visit with prescribing clinician: 11/30/2022           Dayron Tapia MA  11/18/21, 18:07 EST

## 2021-11-19 RX ORDER — PANTOPRAZOLE SODIUM 40 MG/1
TABLET, DELAYED RELEASE ORAL
Qty: 30 TABLET | Refills: 1 | Status: SHIPPED | OUTPATIENT
Start: 2021-11-19 | End: 2021-12-21

## 2021-12-21 RX ORDER — PANTOPRAZOLE SODIUM 40 MG/1
TABLET, DELAYED RELEASE ORAL
Qty: 30 TABLET | Refills: 1 | Status: SHIPPED | OUTPATIENT
Start: 2021-12-21 | End: 2022-01-21 | Stop reason: SDUPTHER

## 2021-12-21 NOTE — TELEPHONE ENCOUNTER
Rx Refill Note  Requested Prescriptions     Pending Prescriptions Disp Refills   • pantoprazole (PROTONIX) 40 MG EC tablet [Pharmacy Med Name: PANTOPRAZOLE SOD DR 40 MG TAB] 30 tablet 1     Sig: TAKE 1 TABLET BY MOUTH EVERY DAY      Last office visit with prescribing clinician: 6/29/2021      Next office visit with prescribing clinician: 11/30/2022            Leslye Ferreira MA  12/21/21, 16:48 EST

## 2022-01-03 ENCOUNTER — TELEMEDICINE (OUTPATIENT)
Dept: FAMILY MEDICINE CLINIC | Facility: CLINIC | Age: 22
End: 2022-01-03

## 2022-01-03 VITALS
BODY MASS INDEX: 28.93 KG/M2 | WEIGHT: 180 LBS | HEIGHT: 66 IN | OXYGEN SATURATION: 99 % | TEMPERATURE: 97.7 F | HEART RATE: 104 BPM | DIASTOLIC BLOOD PRESSURE: 96 MMHG | SYSTOLIC BLOOD PRESSURE: 118 MMHG

## 2022-01-03 DIAGNOSIS — R52 GENERALIZED BODY ACHES: ICD-10-CM

## 2022-01-03 DIAGNOSIS — J06.9 VIRAL UPPER RESPIRATORY TRACT INFECTION: Primary | ICD-10-CM

## 2022-01-03 LAB
EXPIRATION DATE: NORMAL
FLUAV AG NPH QL: NEGATIVE
FLUBV AG NPH QL: NEGATIVE
INTERNAL CONTROL: NORMAL
Lab: 6636

## 2022-01-03 PROCEDURE — 87804 INFLUENZA ASSAY W/OPTIC: CPT | Performed by: FAMILY MEDICINE

## 2022-01-03 PROCEDURE — 99213 OFFICE O/P EST LOW 20 MIN: CPT | Performed by: FAMILY MEDICINE

## 2022-01-03 RX ORDER — ACETAMINOPHEN, GUAIFENESIN, AND PHENYLEPHRINE HYDROCHLORIDE 650; 400; 10 MG/20ML; MG/20ML; MG/20ML
SOLUTION ORAL
COMMUNITY
End: 2023-02-08

## 2022-01-03 RX ORDER — NAPROXEN SODIUM 220 MG
220 TABLET ORAL 2 TIMES DAILY PRN
COMMUNITY
End: 2023-02-08

## 2022-01-03 NOTE — PROGRESS NOTES
"Chief Complaint  Fever (C/o h/a x 4 days with other s/s developing 2 days after), Cough, Headache, URI, and Generalized Body Aches    Subjective          Amy Brown presents to [facility]  History of Present Illness     I am seeing this patient for the first time.  On Christmas she was exposed to Covid.  Previously vaccinated.  About 5 days later started having some fevers and chills and cough and sore throat and sinus congestion.  She is here today to be evaluated.  She has had her sense of taste unchanged but her smell goes in and out.  No UTI symptoms.  She is currently menstruating.  No shortness of breath.    Objective   Vital Signs:   /96   Pulse 104   Temp 97.7 °F (36.5 °C) (Infrared)   Ht 167.6 cm (66\")   Wt 81.6 kg (180 lb) Comment: pt report  SpO2 99%   BMI 29.05 kg/m²     Physical Exam  Vitals and nursing note reviewed.   Constitutional:       General: She is not in acute distress.     Comments: No acute distress.  Nontoxic.   HENT:      Right Ear: Tympanic membrane, ear canal and external ear normal.      Left Ear: Tympanic membrane, ear canal and external ear normal.      Nose: Nose normal.      Mouth/Throat:      Pharynx: No oropharyngeal exudate.      Comments: Tonsils are enlarged but no erythema or exudates.  Eyes:      General: No scleral icterus.        Right eye: No discharge.         Left eye: No discharge.      Conjunctiva/sclera: Conjunctivae normal.   Neck:      Comments: Minimal anterior lymphadenopathy  Cardiovascular:      Rate and Rhythm: Normal rate.   Pulmonary:      Effort: Pulmonary effort is normal. No respiratory distress.      Breath sounds: Normal breath sounds. No stridor. No wheezing or rales.   Lymphadenopathy:      Cervical: Cervical adenopathy present.   Skin:     Findings: No rash.        Result Review :                 Assessment and Plan    Diagnoses and all orders for this visit:    1. Viral upper respiratory tract infection " (Primary)  -     COVID-19,[facility] ROUTINE, NP/OP SWAB IN TRANSPORT MEDIA OR ESWAB 72 HR TAT - Swab, Nasopharynx  -     POC Influenza A / B    2. Generalized body aches  -     COVID-19,[facility] ROUTINE, NP/OP SWAB IN TRANSPORT MEDIA OR ESWAB 72 HR TAT - Swab, Nasopharynx  -     POC Influenza A / B      Probable viral URI syndrome.  The rapid influenza test is negative.  This is suspicious for COVID-19.  Or other viral syndrome.  At this time no other likely cause.  Awaiting PCR COVID-19 test.  May take 2 days or more.  She does not have risk factors that would qualify her for monoclonal antibody infusion, and in either case it is an very short supply.  No other evidence-based therapy needed at this time.  With very severe symptoms she is going to seek medical attention.  Or contact us with questions.  She should quarantine at least 5 days.      Follow Up   No follow-ups on file.  Patient was given instructions and counseling regarding her condition or for health maintenance advice. Please see specific information pulled into the AVS if appropriate.

## 2022-01-04 LAB
LABCORP SARS-COV-2, NAA 2 DAY TAT: NORMAL
SARS-COV-2 RNA RESP QL NAA+PROBE: DETECTED

## 2022-01-18 RX ORDER — TOPIRAMATE 50 MG/1
TABLET, FILM COATED ORAL
Qty: 90 TABLET | Refills: 1 | Status: SHIPPED | OUTPATIENT
Start: 2022-01-18 | End: 2022-12-01

## 2022-01-18 NOTE — TELEPHONE ENCOUNTER
Rx Refill Note  Requested Prescriptions     Pending Prescriptions Disp Refills   • topiramate (TOPAMAX) 50 MG tablet [Pharmacy Med Name: TOPIRAMATE 50 MG TABLET] 90 tablet 0     Sig: TAKE 1 TABLET BY MOUTH EVERYDAY AT BEDTIME      Last office visit with prescribing clinician: 6/29/2021      Next office visit with prescribing clinician: 11/30/2022            Leslye Ferreira MA  01/18/22, 11:39 EST

## 2022-01-21 RX ORDER — PANTOPRAZOLE SODIUM 40 MG/1
40 TABLET, DELAYED RELEASE ORAL DAILY
Qty: 90 TABLET | Refills: 0 | Status: SHIPPED | OUTPATIENT
Start: 2022-01-21 | End: 2022-12-01

## 2022-01-24 RX ORDER — PANTOPRAZOLE SODIUM 40 MG/1
40 TABLET, DELAYED RELEASE ORAL DAILY
Qty: 90 TABLET | Refills: 0 | Status: CANCELLED | OUTPATIENT
Start: 2022-01-24

## 2022-03-21 RX ORDER — NORGESTIMATE AND ETHINYL ESTRADIOL 7DAYSX3 28
KIT ORAL
Qty: 84 TABLET | Refills: 0 | Status: SHIPPED | OUTPATIENT
Start: 2022-03-21 | End: 2022-06-16

## 2022-03-21 NOTE — TELEPHONE ENCOUNTER
Rx Refill Note  Requested Prescriptions     Pending Prescriptions Disp Refills   • Tri-Estarylla 0.18/0.215/0.25 MG-35 MCG per tablet [Pharmacy Med Name: TRI-ESTARYLLA TABLET] 84 tablet 1     Sig: TAKE 1 TABLET BY MOUTH EVERY DAY      Last office visit with prescribing clinician: 6/29/2021      Next office visit with prescribing clinician: 11/30/2022            Leslye Ferreira MA  03/21/22, 13:05 EDT

## 2022-06-16 RX ORDER — NORGESTIMATE AND ETHINYL ESTRADIOL 7DAYSX3 28
KIT ORAL
Qty: 84 TABLET | Refills: 0 | Status: SHIPPED | OUTPATIENT
Start: 2022-06-16 | End: 2022-09-09

## 2022-06-16 NOTE — TELEPHONE ENCOUNTER
Rx Refill Note  Requested Prescriptions     Pending Prescriptions Disp Refills   • Tri-Estarylla 0.18/0.215/0.25 MG-35 MCG per tablet [Pharmacy Med Name: TRI-ESTARYLLA TABLET] 84 tablet 0     Sig: TAKE 1 TABLET BY MOUTH EVERY DAY      Last office visit with prescribing clinician: 6/29/2021      Next office visit with prescribing clinician: 11/30/2022            Lesley Ferreira MA  06/16/22, 08:38 EDT

## 2022-06-17 NOTE — TELEPHONE ENCOUNTER
Pt is overdue for pap smear and office visit.  I will refill once more until she makes an appt for pap smear and OV

## 2022-07-08 ENCOUNTER — OFFICE VISIT (OUTPATIENT)
Dept: FAMILY MEDICINE CLINIC | Facility: CLINIC | Age: 22
End: 2022-07-08

## 2022-07-08 VITALS
HEIGHT: 66 IN | RESPIRATION RATE: 16 BRPM | HEART RATE: 105 BPM | SYSTOLIC BLOOD PRESSURE: 122 MMHG | DIASTOLIC BLOOD PRESSURE: 62 MMHG | WEIGHT: 204.6 LBS | OXYGEN SATURATION: 98 % | TEMPERATURE: 97.3 F | BODY MASS INDEX: 32.88 KG/M2

## 2022-07-08 DIAGNOSIS — R63.5 WEIGHT GAIN: Primary | ICD-10-CM

## 2022-07-08 DIAGNOSIS — T19.2XXD RETAINED TAMPON, SUBSEQUENT ENCOUNTER: ICD-10-CM

## 2022-07-08 PROCEDURE — 99213 OFFICE O/P EST LOW 20 MIN: CPT | Performed by: INTERNAL MEDICINE

## 2022-07-08 NOTE — PROGRESS NOTES
"Chief Complaint  Hospital Follow Up Visit (Abdomenal pain off and on )    Subjective        Amy Brown presents to [facility]  History of Present Illness  Here for hospital f/u 2 weeks ago due to tampon being stuck in vagina for 2 days and she started getting toxic shock sx's.  In the ER they were able to remove the tampon but suggested that she be seen afterwards.  No fever.  No chills or sweats.  Appetite is normal.  No abnormal bleeding from vagina.  Finished her cycle 2 1/2 weeks ago.  She is sexually active and no dyspareunia.  No vaginal d/c.  She is on oral contraceptives  + weight gain and mom and gm have HT.    Objective   Vital Signs:  /62   Pulse 105   Temp 97.3 °F (36.3 °C) (Temporal)   Resp 16   Ht 167.6 cm (66\")   Wt 92.8 kg (204 lb 9.6 oz)   SpO2 98%   BMI 33.02 kg/m²   Estimated body mass index is 33.02 kg/m² as calculated from the following:    Height as of this encounter: 167.6 cm (66\").    Weight as of this encounter: 92.8 kg (204 lb 9.6 oz).          Physical Exam  Vitals and nursing note reviewed.   Constitutional:       Appearance: Normal appearance. She is well-developed.   HENT:      Head: Normocephalic and atraumatic.      Right Ear: External ear normal.      Left Ear: External ear normal.   Eyes:      Extraocular Movements: Extraocular movements intact.      Conjunctiva/sclera: Conjunctivae normal.   Neck:      Vascular: No carotid bruit.   Cardiovascular:      Rate and Rhythm: Normal rate and regular rhythm.      Heart sounds: Normal heart sounds.      Comments: No bruits  Pulmonary:      Effort: Pulmonary effort is normal. No respiratory distress.      Breath sounds: Normal breath sounds. No wheezing or rales.   Abdominal:      General: Bowel sounds are normal. There is no distension.      Palpations: Abdomen is soft. There is no mass.      Tenderness: There is no abdominal tenderness.   Musculoskeletal:      Cervical back: Neck supple. "   Lymphadenopathy:      Cervical: No cervical adenopathy.   Skin:     General: Skin is warm.   Neurological:      General: No focal deficit present.      Mental Status: She is alert and oriented to person, place, and time.   Psychiatric:         Mood and Affect: Mood normal.         Behavior: Behavior normal.         Thought Content: Thought content normal.         Judgment: Judgment normal.        Result Review :                Assessment and Plan   Diagnoses and all orders for this visit:    1. Weight gain (Primary)  -     Comprehensive Metabolic Panel; Future  -     CBC & Differential; Future  -     TSH; Future  -     T4, Free; Future    2. Retained tampon, subsequent encounter    Is doing well and has no signs or symptoms of vaginal irritation infection or toxic shock syndrome.  Because of her weight gain we will check her thyroid and metabolic panel as well as CBC.  She does have a family history of hypothyroidism and about 5 to 6 years ago her TSH was borderline elevated.  I also discussed the importance of eating a healthy diet including 1 low in carbohydrates and low in saturated fats.  I did suggest weight watchers is a good option for her along with regular exercise.         Follow Up   No follow-ups on file.  Patient was given instructions and counseling regarding her condition or for health maintenance advice. Please see specific information pulled into the AVS if appropriate.

## 2022-09-09 RX ORDER — NORGESTIMATE AND ETHINYL ESTRADIOL 7DAYSX3 28
KIT ORAL
Qty: 84 TABLET | Refills: 0 | Status: SHIPPED | OUTPATIENT
Start: 2022-09-09 | End: 2022-12-05

## 2022-09-09 NOTE — TELEPHONE ENCOUNTER
Rx Refill Note  Requested Prescriptions     Pending Prescriptions Disp Refills   • Tri-Estarylla 0.18/0.215/0.25 MG-35 MCG per tablet [Pharmacy Med Name: TRI-ESTARYLLA TABLET] 84 tablet 0     Sig: TAKE 1 TABLET BY MOUTH EVERY DAY      Last office visit with prescribing clinician: 7/8/2022      Next office visit with prescribing clinician: 11/30/2022            Leslye Ferreira MA  09/09/22, 15:58 EDT

## 2022-11-30 ENCOUNTER — OFFICE VISIT (OUTPATIENT)
Dept: FAMILY MEDICINE CLINIC | Facility: CLINIC | Age: 22
End: 2022-11-30

## 2022-11-30 VITALS
HEIGHT: 66 IN | SYSTOLIC BLOOD PRESSURE: 124 MMHG | DIASTOLIC BLOOD PRESSURE: 88 MMHG | BODY MASS INDEX: 33.02 KG/M2 | TEMPERATURE: 97.7 F | RESPIRATION RATE: 16 BRPM | OXYGEN SATURATION: 98 % | HEART RATE: 119 BPM

## 2022-11-30 DIAGNOSIS — H66.92 ACUTE LEFT OTITIS MEDIA: ICD-10-CM

## 2022-11-30 DIAGNOSIS — R52 BODY ACHES: ICD-10-CM

## 2022-11-30 DIAGNOSIS — J10.1 INFLUENZA A: ICD-10-CM

## 2022-11-30 DIAGNOSIS — R50.9 FEVER, UNSPECIFIED FEVER CAUSE: ICD-10-CM

## 2022-11-30 DIAGNOSIS — R06.02 SOB (SHORTNESS OF BREATH): Primary | ICD-10-CM

## 2022-11-30 PROCEDURE — 87428 SARSCOV & INF VIR A&B AG IA: CPT | Performed by: INTERNAL MEDICINE

## 2022-11-30 PROCEDURE — 99213 OFFICE O/P EST LOW 20 MIN: CPT | Performed by: INTERNAL MEDICINE

## 2022-11-30 RX ORDER — AMOXICILLIN 875 MG/1
875 TABLET, COATED ORAL 2 TIMES DAILY
Qty: 14 TABLET | Refills: 0 | Status: SHIPPED | OUTPATIENT
Start: 2022-11-30 | End: 2023-02-08

## 2022-11-30 RX ORDER — BENZONATATE 200 MG/1
200 CAPSULE ORAL 3 TIMES DAILY PRN
Qty: 30 CAPSULE | Refills: 0 | Status: SHIPPED | OUTPATIENT
Start: 2022-11-30 | End: 2023-02-08

## 2022-11-30 RX ORDER — OSELTAMIVIR PHOSPHATE 75 MG/1
75 CAPSULE ORAL 2 TIMES DAILY
Qty: 10 CAPSULE | Refills: 0 | Status: SHIPPED | OUTPATIENT
Start: 2022-11-30 | End: 2023-02-08

## 2022-12-01 LAB
EXPIRATION DATE: ABNORMAL
FLUAV AG UPPER RESP QL IA.RAPID: DETECTED
FLUBV AG UPPER RESP QL IA.RAPID: NOT DETECTED
INTERNAL CONTROL: ABNORMAL
Lab: ABNORMAL
SARS-COV-2 AG UPPER RESP QL IA.RAPID: NOT DETECTED

## 2022-12-02 NOTE — TELEPHONE ENCOUNTER
Rx Refill Note  Requested Prescriptions     Pending Prescriptions Disp Refills   • Tri-Estarylla 0.18/0.215/0.25 MG-35 MCG per tablet [Pharmacy Med Name: TRI-ESTARYLLA TABLET] 84 tablet 0     Sig: TAKE 1 TABLET BY MOUTH EVERY DAY      Last office visit with prescribing clinician: 11/30/2022   Last telemedicine visit with prescribing clinician: Visit date not found   Next office visit with prescribing clinician: Visit date not found       {TIP  Please add Last Relevant Lab Date if appropriate:6/23/22                  Would you like a call back once the refill request has been completed: [] Yes [] No    If the office needs to give you a call back, can they leave a voicemail: [] Yes [] No    Leslye Ferreira MA  12/02/22, 14:32 EST

## 2022-12-05 ENCOUNTER — TELEPHONE (OUTPATIENT)
Dept: FAMILY MEDICINE CLINIC | Facility: CLINIC | Age: 22
End: 2022-12-05

## 2022-12-05 RX ORDER — NORGESTIMATE AND ETHINYL ESTRADIOL 7DAYSX3 28
KIT ORAL
Qty: 84 TABLET | Refills: 0 | Status: SHIPPED | OUTPATIENT
Start: 2022-12-05 | End: 2023-02-27

## 2022-12-05 NOTE — TELEPHONE ENCOUNTER
I sent in a refill for her OCP.  She is overdue for her pap smear.  Please have pt schedule an appt for her pap smear.  I think she was to do it this past week when she got the flu

## 2022-12-05 NOTE — TELEPHONE ENCOUNTER
Reached out to pt cancellation on 12/23/22 for cpe. Placed pt in spot, if she cannot keep she will call and cancel.

## 2022-12-23 ENCOUNTER — TELEPHONE (OUTPATIENT)
Dept: FAMILY MEDICINE CLINIC | Facility: CLINIC | Age: 22
End: 2022-12-23

## 2022-12-23 NOTE — TELEPHONE ENCOUNTER
Caller: Amy Brown    Relationship to patient: Self    Best call back number: [phone]    Type of visit: PHYSICAL WITH PAP    If rescheduling, when is the original appointment: 12/23/22     Additional notes: PATIENT HAD AN APPOINTMENT TODAY, BUT DUE TO THE WEATHER, SHE IS UNABLE TO MAKE IT. HUB DOES NOT SEE ANY AVAILABILITY WITH HER PROVIDER FOR A PHYSICAL WITH PAP. PLEASE CALL PATIENT TO SCHEDULE THE APPOINTMENT.

## 2023-02-08 ENCOUNTER — OFFICE VISIT (OUTPATIENT)
Dept: FAMILY MEDICINE CLINIC | Facility: CLINIC | Age: 23
End: 2023-02-08
Payer: COMMERCIAL

## 2023-02-08 VITALS
SYSTOLIC BLOOD PRESSURE: 140 MMHG | DIASTOLIC BLOOD PRESSURE: 114 MMHG | HEART RATE: 86 BPM | WEIGHT: 217.5 LBS | HEIGHT: 66 IN | TEMPERATURE: 96.8 F | RESPIRATION RATE: 16 BRPM | BODY MASS INDEX: 34.96 KG/M2 | OXYGEN SATURATION: 98 %

## 2023-02-08 DIAGNOSIS — J02.9 SORE THROAT: ICD-10-CM

## 2023-02-08 DIAGNOSIS — Z01.419 ENCOUNTER FOR CERVICAL PAP SMEAR WITH PELVIC EXAM: ICD-10-CM

## 2023-02-08 DIAGNOSIS — N89.8 VAGINAL DISCHARGE: ICD-10-CM

## 2023-02-08 LAB
EXPIRATION DATE: NORMAL
EXPIRATION DATE: NORMAL
FLUAV AG UPPER RESP QL IA.RAPID: NOT DETECTED
FLUBV AG UPPER RESP QL IA.RAPID: NOT DETECTED
INTERNAL CONTROL: NORMAL
INTERNAL CONTROL: NORMAL
Lab: NORMAL
Lab: NORMAL
S PYO AG THROAT QL: NEGATIVE
SARS-COV-2 AG UPPER RESP QL IA.RAPID: NOT DETECTED

## 2023-02-08 PROCEDURE — 87880 STREP A ASSAY W/OPTIC: CPT | Performed by: INTERNAL MEDICINE

## 2023-02-08 PROCEDURE — 87428 SARSCOV & INF VIR A&B AG IA: CPT | Performed by: INTERNAL MEDICINE

## 2023-02-08 PROCEDURE — 99395 PREV VISIT EST AGE 18-39: CPT | Performed by: INTERNAL MEDICINE

## 2023-02-08 RX ORDER — AMOXICILLIN 875 MG/1
875 TABLET, COATED ORAL 2 TIMES DAILY
Qty: 14 TABLET | Refills: 0 | OUTPATIENT
Start: 2023-02-08 | End: 2023-03-15

## 2023-02-08 RX ORDER — FLUCONAZOLE 150 MG/1
150 TABLET ORAL ONCE
Qty: 1 TABLET | Refills: 0 | Status: SHIPPED | OUTPATIENT
Start: 2023-02-08 | End: 2023-02-08

## 2023-02-08 NOTE — PROGRESS NOTES
"Chief Complaint  Annual Exam (Pap ) and Sore Throat (/)    Subjective        Amy Brown presents to [facility]  History of Present Illness  Patient presents today for her Pap smear.  She is not feeling well today.  She has a sore throat, headache, body aches and chills.  No cough.  She did have some diarrhea few episodes this morning as well.  She states after she has her.  She usually will get a yeastlike discharge.  She denies any other symptoms.  No known exposures to sexually transmitted infections and also no exposures to viral or bacterial illnesses.  She is in a monogamous relationship.  Objective   Vital Signs:  BP (!) 140/114 (BP Location: Left arm, Patient Position: Sitting, Cuff Size: Large Adult)   Pulse 86   Temp 96.8 °F (36 °C) (Temporal)   Resp 16   Ht 167.6 cm (66\")   Wt 98.7 kg (217 lb 8 oz)   SpO2 98%   BMI 35.11 kg/m²   Estimated body mass index is 35.11 kg/m² as calculated from the following:    Height as of this encounter: 167.6 cm (66\").    Weight as of this encounter: 98.7 kg (217 lb 8 oz).             Physical Exam  Vitals and nursing note reviewed.   Constitutional:       Appearance: Normal appearance. She is well-developed.   HENT:      Head: Normocephalic and atraumatic.      Right Ear: Tympanic membrane, ear canal and external ear normal.      Left Ear: Tympanic membrane, ear canal and external ear normal.      Mouth/Throat:      Mouth: Mucous membranes are moist.      Pharynx: Oropharyngeal exudate (right tonsil) and posterior oropharyngeal erythema present.   Eyes:      Extraocular Movements: Extraocular movements intact.      Conjunctiva/sclera: Conjunctivae normal.   Cardiovascular:      Rate and Rhythm: Normal rate and regular rhythm.      Heart sounds: Normal heart sounds.      Comments: No bruits  Pulmonary:      Effort: Pulmonary effort is normal. No respiratory distress.      Breath sounds: Normal breath sounds. No wheezing or rales. "   Chest:   Breasts:     Right: No inverted nipple, mass, nipple discharge, skin change or tenderness.      Left: No inverted nipple, mass, nipple discharge, skin change or tenderness.   Abdominal:      General: Bowel sounds are normal. There is no distension.      Palpations: Abdomen is soft. There is no mass.      Tenderness: There is no abdominal tenderness. There is no guarding or rebound.      Hernia: No hernia is present.   Genitourinary:     Exam position: Knee-chest position.      Labia:         Right: No rash, tenderness, lesion or injury.         Left: No rash, tenderness, lesion or injury.       Vagina: Vaginal discharge (thick white curdled.  ) present.      Cervix: No cervical motion tenderness, discharge or friability.      Uterus: Not deviated, not enlarged, not fixed and not tender.       Adnexa:         Right: No mass, tenderness or fullness.          Left: No mass, tenderness or fullness.        Comments: Slight bleeding with brush on cervix    Musculoskeletal:      Cervical back: Neck supple.   Lymphadenopathy:      Cervical: No cervical adenopathy.   Skin:     General: Skin is warm and dry.   Neurological:      General: No focal deficit present.      Mental Status: She is alert and oriented to person, place, and time.   Psychiatric:         Mood and Affect: Mood normal.         Behavior: Behavior normal.         Thought Content: Thought content normal.         Judgment: Judgment normal.        Result Review :                   Assessment and Plan   Diagnoses and all orders for this visit:    1. Sore throat  -     POC Rapid Strep A  -     POCT SARS-CoV-2 Antigen RUPERT + Flu    2. Encounter for cervical Pap smear with pelvic exam  -     Liquid-based Pap Smear, Screening  -     IgP, Aptima HPV; Future    3. Vaginal discharge    Other orders  -     fluconazole (Diflucan) 150 MG tablet; Take 1 tablet by mouth 1 (One) Time for 1 dose.  Dispense: 1 tablet; Refill: 0  -     amoxicillin (AMOXIL) 875 MG  tablet; Take 1 tablet by mouth 2 (Two) Times a Day.  Dispense: 14 tablet; Refill: 0    Thin prep Pap smear sent with HPV and Chlamydia gonorrhea as well as BV testing.  Send in Diflucan for presumed yeast infection.  We did discuss that douching is not beneficial and can change the bacterial kan in the vagina.  I also encouraged her to start taking a probiotic.  Heart rate normalized with resting.  Advised patient to avoid decongestant medications and to keep an eye on her blood pressure.  Sore throat with exudate and presumed fever--could be strep since she has only had sx for a few hours.  Will treat with amoxil.  covid and flu were negative.  If doesn't improve, consider mono testing.         Follow Up   No follow-ups on file.  Patient was given instructions and counseling regarding her condition or for health maintenance advice. Please see specific information pulled into the AVS if appropriate.

## 2023-02-15 LAB
CYTOLOGIST CVX/VAG CYTO: NORMAL
CYTOLOGY CVX/VAG DOC CYTO: NORMAL
CYTOLOGY CVX/VAG DOC THIN PREP: NORMAL
DX ICD CODE: NORMAL
HIV 1 & 2 AB SER-IMP: NORMAL
HPV I/H RISK 4 DNA CVX QL PROBE+SIG AMP: NEGATIVE
OTHER STN SPEC: NORMAL
PATHOLOGIST CVX/VAG CYTO: NORMAL
STAT OF ADQ CVX/VAG CYTO-IMP: NORMAL

## 2023-02-27 RX ORDER — NORGESTIMATE AND ETHINYL ESTRADIOL 7DAYSX3 28
KIT ORAL
Qty: 84 TABLET | Refills: 0 | Status: SHIPPED | OUTPATIENT
Start: 2023-02-27

## 2023-02-27 NOTE — TELEPHONE ENCOUNTER
Rx Refill Note  Requested Prescriptions     Pending Prescriptions Disp Refills   • Tri-Estarylla 0.18/0.215/0.25 MG-35 MCG per tablet [Pharmacy Med Name: TRI-ESTARYLLA TABLET] 84 tablet 0     Sig: TAKE 1 TABLET BY MOUTH EVERY DAY      Last office visit with prescribing clinician: 2/8/2023   Last telemedicine visit with prescribing clinician: 2/27/2023   Next office visit with prescribing clinician: 2/27/2023       {TIP  Please add Last Relevant Lab Date if appropriate: 2/8/23                   Would you like a call back once the refill request has been completed: [] Yes [] No    If the office needs to give you a call back, can they leave a voicemail: [] Yes [] No    Leslye Ferreira MA  02/27/23, 08:54 EST

## 2023-05-22 RX ORDER — NORGESTIMATE AND ETHINYL ESTRADIOL 7DAYSX3 28
KIT ORAL
Qty: 84 TABLET | Refills: 0 | Status: SHIPPED | OUTPATIENT
Start: 2023-05-22

## 2023-06-06 RX ORDER — TOPIRAMATE 25 MG/1
25 TABLET ORAL
Qty: 90 TABLET | Refills: 0 | Status: SHIPPED | OUTPATIENT
Start: 2023-06-06

## 2023-06-06 RX ORDER — NORGESTIMATE AND ETHINYL ESTRADIOL 7DAYSX3 28
1 KIT ORAL DAILY
Qty: 84 TABLET | Refills: 0 | Status: SHIPPED | OUTPATIENT
Start: 2023-06-06

## 2023-06-19 ENCOUNTER — OFFICE VISIT (OUTPATIENT)
Dept: FAMILY MEDICINE CLINIC | Facility: CLINIC | Age: 23
End: 2023-06-19
Payer: COMMERCIAL

## 2023-06-19 VITALS
DIASTOLIC BLOOD PRESSURE: 88 MMHG | SYSTOLIC BLOOD PRESSURE: 135 MMHG | BODY MASS INDEX: 36.14 KG/M2 | TEMPERATURE: 96.9 F | HEIGHT: 66 IN | HEART RATE: 76 BPM | OXYGEN SATURATION: 99 % | WEIGHT: 224.9 LBS

## 2023-06-19 DIAGNOSIS — R05.1 ACUTE COUGH: ICD-10-CM

## 2023-06-19 DIAGNOSIS — J01.00 ACUTE NON-RECURRENT MAXILLARY SINUSITIS: Primary | ICD-10-CM

## 2023-06-19 PROCEDURE — 99214 OFFICE O/P EST MOD 30 MIN: CPT | Performed by: FAMILY MEDICINE

## 2023-06-19 RX ORDER — AZELASTINE 1 MG/ML
2 SPRAY, METERED NASAL 2 TIMES DAILY
Qty: 30 ML | Refills: 3 | Status: SHIPPED | OUTPATIENT
Start: 2023-06-19

## 2023-06-19 RX ORDER — PREDNISONE 20 MG/1
20 TABLET ORAL 2 TIMES DAILY
Qty: 10 TABLET | Refills: 0 | Status: SHIPPED | OUTPATIENT
Start: 2023-06-19

## 2023-06-19 RX ORDER — AMOXICILLIN AND CLAVULANATE POTASSIUM 875; 125 MG/1; MG/1
1 TABLET, FILM COATED ORAL 2 TIMES DAILY
Qty: 20 TABLET | Refills: 0 | Status: SHIPPED | OUTPATIENT
Start: 2023-06-19

## 2023-06-19 NOTE — PROGRESS NOTES
"Chief Complaint  Cough (Pt has been coughing,congestion an sore throat for 2 wks )    Subjective        Amy Brown presents to [facility]  History of Present Illness here with cough, congestion and postnasal drainage for 2 weeks.  Denies any fever giving history of sinus pressure.  Went to urgent care advised her to start taking Mucinex.  She is also taking Claritin as needed.  Complaining of nasal congestion not able to sleep last night.  She is also coughing up greenish she will come.  She had wheezing last night.  Denies any shortness of breath.  History of migraine headaches on Topamax prophylactically  She is also giving history of exposure to black mold 2 weeks ago before starting her symptoms.        Objective   Vital Signs:  /88 (BP Location: Left arm, Patient Position: Sitting, Cuff Size: Adult)   Pulse 76   Temp 96.9 °F (36.1 °C) (Temporal)   Ht 167.6 cm (66\")   Wt 102 kg (224 lb 14.4 oz)   SpO2 99%   BMI 36.30 kg/m²   Estimated body mass index is 36.3 kg/m² as calculated from the following:    Height as of this encounter: 167.6 cm (66\").    Weight as of this encounter: 102 kg (224 lb 14.4 oz).             Physical Exam  Constitutional:       General: She is not in acute distress.     Appearance: Normal appearance. She is well-developed.   HENT:      Head: Normocephalic and atraumatic.      Right Ear: Tympanic membrane normal. No middle ear effusion. Tympanic membrane is not erythematous or bulging.      Left Ear: Tympanic membrane normal.  No middle ear effusion. Tympanic membrane is not erythematous or bulging.      Mouth/Throat:      Mouth: Mucous membranes are moist.   Eyes:      General:         Right eye: No discharge.         Left eye: No discharge.      Extraocular Movements: Extraocular movements intact.      Pupils: Pupils are equal, round, and reactive to light.   Cardiovascular:      Rate and Rhythm: Normal rate and regular rhythm.      Pulses: " Normal pulses.      Heart sounds: Normal heart sounds.   Pulmonary:      Effort: Pulmonary effort is normal.      Breath sounds: Normal breath sounds. No wheezing or rales.   Abdominal:      General: Bowel sounds are normal.      Palpations: Abdomen is soft. There is no mass.      Tenderness: There is no abdominal tenderness.   Musculoskeletal:      Cervical back: Normal range of motion and neck supple.      Right lower leg: No edema.      Left lower leg: No edema.   Lymphadenopathy:      Cervical: No cervical adenopathy.   Neurological:      General: No focal deficit present.      Mental Status: She is alert and oriented to person, place, and time.        Result Review :                   Assessment and Plan   Diagnoses and all orders for this visit:    1. Acute non-recurrent maxillary sinusitis (Primary)  -     amoxicillin-clavulanate (AUGMENTIN) 875-125 MG per tablet; Take 1 tablet by mouth 2 (Two) Times a Day.  Dispense: 20 tablet; Refill: 0  -     predniSONE (DELTASONE) 20 MG tablet; Take 1 tablet by mouth 2 (Two) Times a Day.  Dispense: 10 tablet; Refill: 0    2. Acute cough  -     amoxicillin-clavulanate (AUGMENTIN) 875-125 MG per tablet; Take 1 tablet by mouth 2 (Two) Times a Day.  Dispense: 20 tablet; Refill: 0  -     predniSONE (DELTASONE) 20 MG tablet; Take 1 tablet by mouth 2 (Two) Times a Day.  Dispense: 10 tablet; Refill: 0    Other orders  -     azelastine (ASTELIN) 0.1 % nasal spray; 2 sprays into the nostril(s) as directed by provider 2 (Two) Times a Day.  Dispense: 30 mL; Refill: 3         Amy Brown is a 23-year-old female patient seen today for  Acute sinusitis, will start on Augmentin and prednisone.  I advised her to continue taking Claritin.  I will also start her on Astelin nasal spray.             Follow Up   There are no Patient Instructions on file for this visit.   No follow-ups on file.  Patient was given instructions and counseling regarding her condition or for health maintenance  advice. Please see specific information pulled into the AVS if appropriate.

## 2023-08-14 RX ORDER — NORGESTIMATE AND ETHINYL ESTRADIOL 7DAYSX3 28
KIT ORAL
Qty: 84 TABLET | Refills: 0 | Status: SHIPPED | OUTPATIENT
Start: 2023-08-14

## 2023-08-30 RX ORDER — TOPIRAMATE 25 MG/1
TABLET ORAL
Qty: 90 TABLET | Refills: 0 | Status: SHIPPED | OUTPATIENT
Start: 2023-08-30

## 2023-08-30 NOTE — TELEPHONE ENCOUNTER
Rx Refill Note  Requested Prescriptions     Pending Prescriptions Disp Refills    topiramate (TOPAMAX) 25 MG tablet [Pharmacy Med Name: TOPIRAMATE 25 MG TABLET] 90 tablet 0     Sig: TAKE 1 TABLET BY MOUTH EVERYDAY AT BEDTIME      Last office visit with prescribing clinician: 2/8/2023   Last telemedicine visit with prescribing clinician: Visit date not found   Next office visit with prescribing clinician: 1/24/2024                         Would you like a call back once the refill request has been completed: [] Yes [] No    If the office needs to give you a call back, can they leave a voicemail: [] Yes [] No    Bryan Ferrer MA  08/30/23, 15:55 EDT

## 2023-10-26 RX ORDER — NORGESTIMATE AND ETHINYL ESTRADIOL 7DAYSX3 28
KIT ORAL
Qty: 84 TABLET | Refills: 0 | Status: SHIPPED | OUTPATIENT
Start: 2023-10-26

## 2023-12-12 ENCOUNTER — OFFICE VISIT (OUTPATIENT)
Dept: FAMILY MEDICINE CLINIC | Facility: CLINIC | Age: 23
End: 2023-12-12
Payer: COMMERCIAL

## 2023-12-12 VITALS
HEIGHT: 65 IN | TEMPERATURE: 97.1 F | DIASTOLIC BLOOD PRESSURE: 87 MMHG | WEIGHT: 244.1 LBS | SYSTOLIC BLOOD PRESSURE: 121 MMHG | BODY MASS INDEX: 40.67 KG/M2 | HEART RATE: 92 BPM | OXYGEN SATURATION: 99 %

## 2023-12-12 DIAGNOSIS — R11.0 NAUSEA: ICD-10-CM

## 2023-12-12 DIAGNOSIS — J02.9 SORE THROAT: ICD-10-CM

## 2023-12-12 DIAGNOSIS — R50.9 FEVER, UNSPECIFIED FEVER CAUSE: Primary | ICD-10-CM

## 2023-12-12 DIAGNOSIS — R05.8 DRY COUGH: ICD-10-CM

## 2023-12-12 PROCEDURE — 99213 OFFICE O/P EST LOW 20 MIN: CPT | Performed by: INTERNAL MEDICINE

## 2023-12-12 RX ORDER — DEXTROMETHORPHAN HYDROBROMIDE AND PROMETHAZINE HYDROCHLORIDE 15; 6.25 MG/5ML; MG/5ML
5 SYRUP ORAL 4 TIMES DAILY PRN
Qty: 118 ML | Refills: 0 | Status: SHIPPED | OUTPATIENT
Start: 2023-12-12

## 2023-12-12 RX ORDER — AMOXICILLIN 875 MG/1
875 TABLET, COATED ORAL 2 TIMES DAILY
Qty: 20 TABLET | Refills: 0 | Status: SHIPPED | OUTPATIENT
Start: 2023-12-12

## 2023-12-12 RX ORDER — ONDANSETRON 4 MG/1
4 TABLET, ORALLY DISINTEGRATING ORAL EVERY 8 HOURS PRN
COMMUNITY
Start: 2023-06-17 | End: 2023-12-12 | Stop reason: SDUPTHER

## 2023-12-12 RX ORDER — FLUTICASONE PROPIONATE 50 MCG
1 SPRAY, SUSPENSION (ML) NASAL DAILY
COMMUNITY
Start: 2023-06-17 | End: 2023-12-18

## 2023-12-12 RX ORDER — ONDANSETRON 4 MG/1
4 TABLET, ORALLY DISINTEGRATING ORAL EVERY 8 HOURS PRN
Qty: 20 TABLET | Refills: 0 | Status: SHIPPED | OUTPATIENT
Start: 2023-12-12

## 2023-12-12 NOTE — PROGRESS NOTES
"Chief Complaint  Office visit  (Nausea, vomiting, fever of 102, body aches, chill, cough- screening symptoms started Sunday morning. )    Subjective        Amy Brown presents to [facility]  Sore Throat       2 days of dry cough, body aches, N/v but no diarrhea.  Went to  yesterday and tested negative for covid, flu and strep.  Saw fluid in her ears.  Boyfriend had similar sx that started on Friday and he is already better.  Temp was as high as 102 yesterday.  Has had intermittent nausea and has vomited a few times yesterday when she would eat.  Clear RN.  ST throughout the day and it is worse at night.  She is taking mucinex and tylenol.  Wanting work note to work from home this week due to being contagious    Objective   Vital Signs:  /87 (BP Location: Left arm, Patient Position: Sitting, Cuff Size: Large Adult)   Pulse 92   Temp 97.1 °F (36.2 °C)   Ht 165.1 cm (65\")   Wt 111 kg (244 lb 1.6 oz)   SpO2 99%   BMI 40.62 kg/m²   Estimated body mass index is 40.62 kg/m² as calculated from the following:    Height as of this encounter: 165.1 cm (65\").    Weight as of this encounter: 111 kg (244 lb 1.6 oz).       Answers submitted by the patient for this visit:  Primary Reason for Visit (Submitted on 12/12/2023)  What is the primary reason for your visit?: Sore Throat        Physical Exam  Vitals and nursing note reviewed.   Constitutional:       Appearance: Normal appearance.   HENT:      Head: Normocephalic and atraumatic.      Right Ear: Tympanic membrane, ear canal and external ear normal.      Left Ear: Tympanic membrane, ear canal and external ear normal.      Ears:      Comments: Normal TM's     Mouth/Throat:      Mouth: Mucous membranes are moist.      Pharynx: Posterior oropharyngeal erythema present.      Comments: + PND with posterior redness but no exudate and tonsils are normal  Eyes:      Conjunctiva/sclera: Conjunctivae normal.   Cardiovascular:      Rate and " Rhythm: Normal rate and regular rhythm.      Heart sounds: Normal heart sounds.   Pulmonary:      Effort: Pulmonary effort is normal. No respiratory distress.      Breath sounds: Normal breath sounds. No stridor. No wheezing, rhonchi or rales.   Chest:      Chest wall: No tenderness.   Lymphadenopathy:      Cervical: No cervical adenopathy.   Neurological:      General: No focal deficit present.      Mental Status: She is alert and oriented to person, place, and time.   Psychiatric:         Mood and Affect: Mood normal.         Behavior: Behavior normal.         Thought Content: Thought content normal.         Judgment: Judgment normal.        Result Review :                   Assessment and Plan   Diagnoses and all orders for this visit:    1. Fever, unspecified fever cause (Primary)    2. Sore throat    3. Nausea    4. Dry cough    Other orders  -     ondansetron ODT (ZOFRAN-ODT) 4 MG disintegrating tablet; Place 1 tablet on the tongue Every 8 (Eight) Hours As Needed for Nausea or Vomiting.  Dispense: 20 tablet; Refill: 0  -     amoxicillin (AMOXIL) 875 MG tablet; Take 1 tablet by mouth 2 (Two) Times a Day.  Dispense: 20 tablet; Refill: 0  -     promethazine-dextromethorphan (PROMETHAZINE-DM) 6.25-15 MG/5ML syrup; Take 5 mL by mouth 4 (Four) Times a Day As Needed for Cough.  Dispense: 118 mL; Refill: 0    Viral Syndrome most likely.  She does have h/o tonsillitis but her tonsils look ok.  Will give amoxil to start in 48 hours if she is worsening or fails to improve.  She can use phenergan DM for cough and nausea and the zofran only if having breakthrough sx.  She will start flonase and continue mucinex and prn tylenol.  Work note provided to work from home this week.           Follow Up   No follow-ups on file.  Patient was given instructions and counseling regarding her condition or for health maintenance advice. Please see specific information pulled into the AVS if appropriate.

## 2024-01-17 ENCOUNTER — E-VISIT (OUTPATIENT)
Dept: ADMINISTRATIVE | Facility: OTHER | Age: 24
End: 2024-01-17
Payer: COMMERCIAL

## 2024-01-17 ENCOUNTER — TELEMEDICINE (OUTPATIENT)
Dept: FAMILY MEDICINE CLINIC | Facility: TELEHEALTH | Age: 24
End: 2024-01-17
Payer: COMMERCIAL

## 2024-01-17 DIAGNOSIS — J06.9 ACUTE URI: Primary | ICD-10-CM

## 2024-01-17 RX ORDER — AZITHROMYCIN 500 MG/1
500 TABLET, FILM COATED ORAL DAILY
Qty: 5 TABLET | Refills: 0 | Status: SHIPPED | OUTPATIENT
Start: 2024-01-17 | End: 2024-01-22

## 2024-01-17 NOTE — E-VISIT ESCALATED
Chief Complaint: Cold, flu, COVID, sinus, hay fever, or seasonal allergies   Patient was shown the following escalation message:   Some conditions need a visit with a healthcare provider   Because you have a sore throat, fever, and jaw symptoms, you should speak with a provider to get care.   If you start to have trouble breathing, go directly to the nearest emergency room.   Otherwise, select an option below.   ----------   Patient Interview Transcript:   Which of these symptoms are bothering you? Select all that apply.    Cough    Shortness of breath    Stuffed-up nose or sinuses    Runny nose    Itchy nose or sneezing    Sore throat    Hoarse voice or loss of voice    Headache    Fever    Sweats    Chills    Muscle or body aches    Fatigue or tiredness   Not selected:    Itchy or watery eyes    Loss of smell or taste    Nausea or vomiting    Diarrhea    I don't have any of these symptoms   When did your current symptoms start? Select one.    3 to 5 days ago   Not selected:    Less than 48 hours ago    6 to 9 days ago    10 to 14 days ago    2 to 3 weeks ago    3 to 4 weeks ago    More than a month ago   Do you know the exact date your symptoms started? If so, enter the date as MM/DD/YY. Select one.    Yes (specify): 1/14/24   Not selected:    No   Did your symptoms come on suddenly or gradually? Select one.    Gradually   Not selected:    Suddenly    I'm not sure   Since your current symptoms started, have you been tested for COVID-19? This includes home self-tests as well as nose swab or saliva tests done at a doctor's office, lab, or testing site. Select one.    No   Not selected:    Yes   Taking a home COVID test can help your provider give you the best care. - If you have a COVID test kit, take the test now before continuing this interview. - If you choose not to take a test or don't have one, you should still continue this interview. Your provider can still help you get care. Do you have a COVID test kit?  "Select one.    No, I don't have a test kit   Not selected:    Yes, and I'll take a test now    Yes, but I prefer not to take a test now   Has anyone in your household tested positive for COVID-19 in the past 14 days? Select one.    No   Not selected:    Yes   In the last 14 days, have you had close contact with someone who has COVID-19? \"Close contact\" means any of these: - Caring for someone with COVID-19. - Being within 6 feet of someone with COVID-19 for a total of at least 15 minutes over a 24-hour period. For example, three 5-minute exposures for a total of 15 minutes. - Being in direct contact with respiratory droplets from someone with COVID-19 (being coughed on, kissing, sharing utensils). Select one.    No, not that I know of   Not selected:    Yes, a confirmed case    Yes, a suspected case   Have you gotten the [id number] updated COVID-19 vaccine? This means either the updated Pfizer-BioNTech or Moderna vaccine after September 12, 2023; or the updated Novavax vaccine after October 3, 2023. Select one.    No   Not selected:    Yes   How would you describe your shortness of breath? Sometimes shortness of breath can be a sign of a more serious condition. Select one.    Moderate (it's bad, but I can still do simple things like get dressed, bathe, or comb my hair)   Not selected:    Mild (about what I normally have with a cold)    Severe (it's so bad that I can't do simple things like get dressed, bathe, or comb my hair)   We'd like to find out more about your shortness of breath. Try to say the following sentence out loud: \"I went to the store today to buy bread, milk, and eggs.\" Can you get to the end of the sentence without stopping for breath? If not, you may need emergency care.    Yes   Not selected:    No   Are you able to take a full, deep breath? A full, deep breath means inhaling for 3 to 4 seconds. If you can't do this, you may need to seek emergency care. Select one.    Yes   Not selected:    No   " Since your symptoms started, have you felt dizzy? Select one.    Yes, but I can still do my regular daily activities   Not selected:    Yes, and it makes it hard to stand, walk, or do daily activities    No   Do you have any of these devices at home? Select all that apply.    Apple Watch or other smart watch   Not selected:    A home pulse oximeter    Home blood pressure monitor    FitBit or other smart wristband    Other wearable device    No   Have you been wearing the device since your symptoms started? Select one.    No   Not selected:    Yes   You mentioned having a fever. Do you have a fever now? Select one.    Yes, and I've had one since my symptoms started   Not selected:    Yes, but I didn't have one when my symptoms started    No, it's gone now   Did you take your temperature with a thermometer? Select one.    Yes   Not selected:    No, but it felt mild    No, but it felt high   What was the highest reading on the thermometer? Select one.    101.6 to 101.9F   Not selected:    Below 100.4F    100.4 to 101.5F    102.0 to 103.0F    Above 103.0F   How long have you had a fever? Select one.    Less than 24 hours   Not selected:    1 to 3 days    4 or more days   You mentioned having a headache. On a scale of 1 to 10, how severe is your headache pain? Select one.    Moderate (4 to 6)   Not selected:    Mild (1 to 3)    Severe (7 to 9)    Unbearable (10)    The worst headache of my life (10+)   Do you cough so hard that it's made you gag or vomit? By gag, we mean has your coughing made you choke or dry heave? Select all that apply.    Yes, my coughing has made me gag   Not selected:    Yes, my coughing has made me vomit    No   Does your cough come in spasms of 10 to 20 coughs in a row, followed by a loud whoop when breathing in? Select one.    No   Not selected:    Yes   When is your cough the worst? Select all that apply.    I haven't noticed a difference depending on time of day   Not selected:    In the  "morning, or when I wake up    During the day    At nighttime, or while I'm sleeping   Are you coughing up mucus or phlegm? Select one.    Yes, a little   Not selected:    No, my cough is dry    Yes, a lot   What color is most of the mucus or phlegm that you're coughing up? Select one.    Green or greenish   Not selected:    Clear    White/frothy    Yellow or yellowish    Red or pink    I'm not sure   Do you feel sinus pain or pressure in any of these areas?    In my forehead    Around my eyes    Behind my nose    In my cheeks   Not selected:    In my upper teeth or jaw    No   When did you first notice your sinus pain or pressure? Select one.    5 to 9 days ago   Not selected:    Less than 5 days ago    10 to 14 days ago    2 to 4 weeks ago    1 month ago or longer   Does coughing, sneezing, or leaning forward make your sinuses feel worse? Select one.    Yes   Not selected:    No   What color is your nasal drainage? Select one.    Clear   Not selected:    White    Yellow or yellowish    Green or greenish    My nose is stuffed but not draining or running   Is your nasal drainage thick or thin? Select one.    Thick   Not selected:    Thin   Is there any drainage (mucus) going down the back of your throat? This kind of drainage is also called \"postnasal drip.\" It can cause frequent throat clearing. Select one.    Yes   Not selected:    No, not that I know of   Can you swallow liquids and solid foods? A sore throat may be painful when swallowing, but it shouldn't prevent you from swallowing. Select one.    Yes, but it's uncomfortable   Not selected:    Yes, with ease    Yes, but it's painful    It's hard to swallow anything because it feels like liquids and food get stuck in my throat    No, I can't swallow anything, liquid or solid foods   Is your throat pain worse on one side than the other? Select one.    No, it's the same on both sides   Not selected:    Yes, it's worse on the right side    Yes, it's worse on the " left side   Do you have chest pain? You might also feel it as discomfort, aching, tightness, or squeezing in the chest. Select one.    Yes   Not selected:    No   Does your chest hurt only when you cough? Select one.    Yes   Not selected:    No, it hurts even when I'm not coughing   Have you urinated at least 3 times in the last 24 hours? Select one.    Yes   Not selected:    No   Do your face, lips, or nail beds appear blue or gray? Select one.    No   Not selected:    Yes   Do you have any swelling, pain, redness, or increased warmth in the calf or lower part of ONE leg only? Select one.    No   Not selected:    Yes   Changes in alertness or awareness may mean you need emergency care. Since your symptoms started, have you had any of these? Select all that apply.    None of the above   Not selected:    Confusion    Slurred speech    Not knowing where you are or what day it is    Difficulty staying conscious    Fainting or passing out   Do your symptoms include a whistling sound, or wheezing, when you breathe? Select one.    No   Not selected:    Yes   Early in this interview, you told us you were hoarse or you'd lost your voice. How would you describe the changes to your voice? Select one.    I can barely talk at all   Not selected:    It just sounds a little raspy    It's harder than usual to talk   Is it possible that you strained your voice? Singing, yelling, or talking more or louder than usual can cause voice strain. Select one.    No, not that I know of   Not selected:    Yes   Since your symptoms started, have you noticed that one or both of your eyes is bulging or poking out? Select one.    No   Not selected:    Yes   Do you have any of these symptoms in your ear(s)? Select all that apply.    Pain    Pressure    Fullness    Plugged or blocked sensation   Not selected:    Crackling or popping    None of the above   Can you move your chin toward your chest?    Yes   Not selected:    No, my neck is too  stiff   Try opening your mouth as wide as you can. Are you able to open your mouth all the way as you normally would? Select one.    No   Not selected:    Yes   When you try to open your mouth, does it feel like your jaw locks up? You may feel as if you can't open your mouth all the way or have trouble eating, speaking, or brushing your teeth. Select one.    Yes   Not selected:    No, not that I can tell   ----------   Medical history   Medical history data does not currently exist for this patient.

## 2024-01-17 NOTE — LETTER
[facility]  [facility]  [address]  [address]  [place] [address]  Phone: [phone]  Fax: [phone]    Amy Brown was seen and treated in our Urgent Care on 1/17/2024.  She may return to work on1/22/24.  Please allow pt to work from home during this time due to her symptoms     .        Thank you for choosing [facility].      GAGE Currie

## 2024-01-17 NOTE — PROGRESS NOTES
You have chosen to receive care through a telehealth visit.  Do you consent to use a video/audio connection for your medical care today? Yes     CHIEF COMPLAINT  Chief Complaint   Patient presents with    Nasal Congestion    Cough         HPI  Amy Brown is a 23 y.o. female  presents with complaint of nasal congestion and cough that started 4 days ago. She also has a temp of 101. She has not done a COVID test yet.    Review of Systems   Constitutional:  Positive for fever.   HENT:  Positive for congestion and postnasal drip.    Respiratory:  Positive for cough.    All other systems reviewed and are negative.      Past Medical History:   Diagnosis Date    Allergic     Anemia     Anxiety     ASD (atrial septal defect)     closed on its own at 1 year of age    Depression     Headache 3/2/2021    Hiatal hernia     Obesity        Family History   Problem Relation Age of Onset    Anxiety disorder Mother     Depression Mother     Mental illness Mother     Thyroid disease Mother     Hyperlipidemia Father     Hypertension Maternal Grandmother     Arthritis Maternal Grandmother     Hypertension Maternal Grandfather     Alcohol abuse Maternal Grandfather     Cancer Paternal Grandmother     Diabetes Paternal Grandmother     COPD Paternal Grandfather        Social History     Socioeconomic History    Marital status: Single   Tobacco Use    Smoking status: Never    Smokeless tobacco: Never   Vaping Use    Vaping Use: Never used   Substance and Sexual Activity    Alcohol use: Yes     Alcohol/week: 1.0 standard drink of alcohol     Types: 1 Drinks containing 0.5 oz of alcohol per week    Drug use: No    Sexual activity: Yes     Partners: Male     Birth control/protection: Condom, Birth control pill, OCP       Amy Brown  reports that she has never smoked. She has never used smokeless tobacco..       There were no vitals taken for this visit.    PHYSICAL EXAM  Physical Exam   Constitutional: She appears well-developed and  well-nourished.   HENT:   Head: Normocephalic.   Eyes: Pupils are equal, round, and reactive to light.   Pulmonary/Chest: Effort normal.   Musculoskeletal: Normal range of motion.   Neurological: She is alert.   Psychiatric: She has a normal mood and affect.       Results for orders placed or performed during the hospital encounter of 03/15/23   POCT VERITOR SARS-CoV-2 Antigen ([id number])    Specimen: Nasopharynx; Swab   Result Value Ref Range    SARS Antigen Not Detected Not Detected, Presumptive Negative    Internal Control Passed Passed    Lot Number [id number]     Expiration Date 6/4/2023        Diagnoses and all orders for this visit:    1. Acute URI (Primary)  -     azithromycin (Zithromax) 500 MG tablet; Take 1 tablet by mouth Daily for 5 days.  Dispense: 5 tablet; Refill: 0          FOLLOW-UP  As discussed during visit with PCP/[facility] if no improvement or Urgent Care/Emergency Department if worsening of symptoms    Patient verbalizes understanding of medication dosage, comfort measures, instructions for treatment and follow-up.    Kamini Soto, GAGE  01/17/2024  06:19 EST    The use of a video visit has been reviewed with the patient and verbal informed consent has been obtained. Myself and Amy Brown participated in this visit. The patient is located in [address] [place] [address].    I am located in [place], [place]. Mychart and Twilio were utilized. I spent 10 minutes in the patient's chart for this visit.

## 2024-01-24 ENCOUNTER — OFFICE VISIT (OUTPATIENT)
Dept: FAMILY MEDICINE CLINIC | Facility: CLINIC | Age: 24
End: 2024-01-24
Payer: COMMERCIAL

## 2024-01-24 VITALS
BODY MASS INDEX: 41.52 KG/M2 | HEART RATE: 80 BPM | HEIGHT: 65 IN | SYSTOLIC BLOOD PRESSURE: 106 MMHG | OXYGEN SATURATION: 99 % | WEIGHT: 249.2 LBS | TEMPERATURE: 97.8 F | DIASTOLIC BLOOD PRESSURE: 86 MMHG

## 2024-01-24 DIAGNOSIS — F41.9 ANXIETY: ICD-10-CM

## 2024-01-24 DIAGNOSIS — R63.5 WEIGHT GAIN: Primary | ICD-10-CM

## 2024-01-24 DIAGNOSIS — Z79.899 HIGH RISK MEDICATION USE: ICD-10-CM

## 2024-01-24 PROCEDURE — 99214 OFFICE O/P EST MOD 30 MIN: CPT | Performed by: INTERNAL MEDICINE

## 2024-01-24 RX ORDER — HYDROXYZINE HYDROCHLORIDE 10 MG/1
10 TABLET, FILM COATED ORAL 3 TIMES DAILY PRN
Qty: 30 TABLET | Refills: 0 | Status: SHIPPED | OUTPATIENT
Start: 2024-01-24

## 2024-01-24 RX ORDER — SEMAGLUTIDE 0.25 MG/.5ML
0.25 INJECTION, SOLUTION SUBCUTANEOUS WEEKLY
Qty: 3 ML | Refills: 3 | Status: SHIPPED | OUTPATIENT
Start: 2024-01-24

## 2024-01-24 NOTE — PROGRESS NOTES
"Chief Complaint  Gynecologic Exam (Physical Exam / PAP)    Subjective        Amy Brown presents to [facility]  History of Present Illness  She is here today for pap smear but her last pap smear was 2/8/23 and therefore it hasn't been a full year.  Will postpone her pap.  However, she wants to talk about weight gain.  She is c/o feeling more stressed and ovewhelmed with all things going on in life.  Feels like she is more anxious and is asking if can get a refill on atarax that she took when she was in high school prn.   Had covid 2 weeks ago but has recovered.  Has had a few illnesses over the past year and wonders if it is contributed by recent weight gain.  States that she has a hard time losing weight with diets and lifestyle changes.  She thinks that when she gets more active and goes to gym, she gets a virus or infection.  Is wondering why she can't lose weight.  She is back in school for her masters in management and got engaged a few weeks ago.    Objective   Vital Signs:  /86 (BP Location: Left arm, Patient Position: Sitting, Cuff Size: Large Adult)   Pulse 80   Temp 97.8 °F (36.6 °C)   Ht 165.1 cm (65\")   Wt 113 kg (249 lb 3.2 oz)   SpO2 99%   BMI 41.47 kg/m²   Estimated body mass index is 41.47 kg/m² as calculated from the following:    Height as of this encounter: 165.1 cm (65\").    Weight as of this encounter: 113 kg (249 lb 3.2 oz).       Class 3 Severe Obesity (BMI >=40). Obesity-related health conditions include the following:  anxiety . Obesity is unchanged. BMI is is above average; no BMI management plan is appropriate. We discussed low calorie, low carb based diet program, portion control, increasing exercise, Weight Watchers or other Commercial based weight reduction program, and pharmacologic options including wegovy .      Physical Exam  Vitals and nursing note reviewed.   Constitutional:       Appearance: Normal appearance. She is well-developed. "   HENT:      Head: Normocephalic and atraumatic.      Right Ear: External ear normal.      Left Ear: External ear normal.   Eyes:      Extraocular Movements: Extraocular movements intact.      Conjunctiva/sclera: Conjunctivae normal.   Neck:      Vascular: No carotid bruit.   Cardiovascular:      Rate and Rhythm: Normal rate and regular rhythm.      Heart sounds: Normal heart sounds.      Comments: No bruits  Pulmonary:      Effort: Pulmonary effort is normal. No respiratory distress.      Breath sounds: Normal breath sounds. No stridor. No wheezing, rhonchi or rales.   Chest:      Chest wall: No tenderness.   Abdominal:      General: Bowel sounds are normal. There is no distension.      Palpations: Abdomen is soft. There is no mass.      Tenderness: There is no abdominal tenderness. There is no guarding or rebound.      Hernia: No hernia is present.   Musculoskeletal:      Cervical back: Neck supple.   Lymphadenopathy:      Cervical: No cervical adenopathy.   Skin:     General: Skin is warm.   Neurological:      Mental Status: She is alert and oriented to person, place, and time. Mental status is at baseline.   Psychiatric:         Mood and Affect: Mood normal.         Behavior: Behavior normal.         Thought Content: Thought content normal.         Judgment: Judgment normal.        Result Review :                     Assessment and Plan     Diagnoses and all orders for this visit:    1. Weight gain (Primary)  -     Hemoglobin A1c; Future  -     Comprehensive Metabolic Panel; Future  -     CBC & Differential; Future  -     TSH; Future  -     T4, Free; Future  -     T3, Free; Future  -     Lipase; Future    2. BMI 40.0-44.9, adult  -     Hemoglobin A1c; Future  -     Comprehensive Metabolic Panel; Future  -     CBC & Differential; Future  -     TSH; Future  -     T4, Free; Future  -     T3, Free; Future  -     Lipase; Future    3. Anxiety  -     Hemoglobin A1c; Future  -     Comprehensive Metabolic Panel;  Future  -     CBC & Differential; Future  -     TSH; Future  -     T4, Free; Future  -     T3, Free; Future  -     Lipase; Future    4. High risk medication use  -     Hemoglobin A1c; Future  -     Comprehensive Metabolic Panel; Future  -     CBC & Differential; Future  -     TSH; Future  -     T4, Free; Future  -     T3, Free; Future  -     Lipase; Future    Other orders  -     Semaglutide-Weight Management (Wegovy) 0.25 MG/0.5ML solution auto-injector; Inject 0.25 mg under the skin into the appropriate area as directed 1 (One) Time Per Week.  Dispense: 3 mL; Refill: 3  -     hydrOXYzine (ATARAX) 10 MG tablet; Take 1 tablet by mouth 3 (Three) Times a Day As Needed for Anxiety.  Dispense: 30 tablet; Refill: 0    I would like to get blood work to assess her before hopefully getting Wegovy started.  She is going to check with her insurance about coverage.  She is a good candidate for Wegovy based on elevated BMI.  Side effects have been discussed and mechanism of action also discussed.  It is been prescribed to her pharmacy.  She has no history of pancreatitis and no personal nor family history of thyroid cancer.  I have also encouraged her to start following a diet either similar to weight watchers or the Mediterranean diet.  Exercise would be beneficial as well.  For anxiety I have prescribed hydroxyzine that she used to take years ago as needed.  This worked well for her in the past and she would prefer to be on something as needed versus daily.  She seems to have more anxiety and not depression.  I did consider Wellbutrin but with her having more anxiety I think Wellbutrin could possibly make that worse.  I will see her back in 3 to 4 months with a Pap smear and to follow-up on hopefully her labs with Wegovy on board.         Follow Up     No follow-ups on file.  Patient was given instructions and counseling regarding her condition or for health maintenance advice. Please see specific information pulled into the  AVS if appropriate.

## 2024-01-25 RX ORDER — SEMAGLUTIDE 0.25 MG/.5ML
0.25 INJECTION, SOLUTION SUBCUTANEOUS WEEKLY
Qty: 3 ML | Refills: 3 | Status: CANCELLED | OUTPATIENT
Start: 2024-01-25

## 2024-02-02 ENCOUNTER — TELEPHONE (OUTPATIENT)
Dept: FAMILY MEDICINE CLINIC | Facility: CLINIC | Age: 24
End: 2024-02-02

## 2024-02-02 NOTE — TELEPHONE ENCOUNTER
Hub staff attempted to follow warm transfer process and was unsuccessful     Caller: Socorro Brown    Relationship to patient: Mother    Best call back number: [phone]     Patient is needing: PLEASE CALL PATIENT'S MOM TO RESCHEDULE LABS

## 2024-02-02 NOTE — TELEPHONE ENCOUNTER
Hub staff attempted to follow warm transfer process and was unsuccessful     Caller: Socorro Brown    Relationship to patient: Mother    Best call back number: [phone]    Patient is needing: PATIENTS MOTHER RETURNED CALL TO RESCHEDULE LABS, PLEASE CALL HER BACK.

## 2024-02-15 RX ORDER — NORGESTIMATE AND ETHINYL ESTRADIOL 7DAYSX3 28
1 KIT ORAL DAILY
Qty: 84 TABLET | Refills: 0 | Status: SHIPPED | OUTPATIENT
Start: 2024-02-15

## 2024-03-07 RX ORDER — HYDROXYZINE HYDROCHLORIDE 10 MG/1
10 TABLET, FILM COATED ORAL 3 TIMES DAILY PRN
Qty: 30 TABLET | Refills: 0 | Status: SHIPPED | OUTPATIENT
Start: 2024-03-07

## 2024-03-07 NOTE — TELEPHONE ENCOUNTER
Rx Refill Note  Requested Prescriptions     Pending Prescriptions Disp Refills    hydrOXYzine (ATARAX) 10 MG tablet 30 tablet 0     Sig: Take 1 tablet by mouth 3 (Three) Times a Day As Needed for Anxiety.      Last office visit with prescribing clinician: 1/24/2024   Last telemedicine visit with prescribing clinician: Visit date not found   Next office visit with prescribing clinician: 5/28/2024                         Would you like a call back once the refill request has been completed: [] Yes [] No    If the office needs to give you a call back, can they leave a voicemail: [] Yes [] No    Bryan Ferrer MA  03/07/24, 13:40 EST

## 2024-04-02 ENCOUNTER — E-VISIT (OUTPATIENT)
Dept: ADMINISTRATIVE | Facility: OTHER | Age: 24
End: 2024-04-02
Payer: COMMERCIAL

## 2024-04-02 ENCOUNTER — TELEMEDICINE (OUTPATIENT)
Dept: FAMILY MEDICINE CLINIC | Facility: TELEHEALTH | Age: 24
End: 2024-04-02
Payer: COMMERCIAL

## 2024-04-02 DIAGNOSIS — J30.2 SEASONAL ALLERGIES: ICD-10-CM

## 2024-04-02 DIAGNOSIS — J06.9 UPPER RESPIRATORY TRACT INFECTION, UNSPECIFIED TYPE: Primary | ICD-10-CM

## 2024-04-02 RX ORDER — BROMPHENIRAMINE MALEATE, PSEUDOEPHEDRINE HYDROCHLORIDE, AND DEXTROMETHORPHAN HYDROBROMIDE 2; 30; 10 MG/5ML; MG/5ML; MG/5ML
5 SYRUP ORAL 4 TIMES DAILY PRN
Qty: 118 ML | Refills: 0 | Status: SHIPPED | OUTPATIENT
Start: 2024-04-02 | End: 2024-04-07

## 2024-04-02 RX ORDER — DEXTROMETHORPHAN HYDROBROMIDE AND PROMETHAZINE HYDROCHLORIDE 15; 6.25 MG/5ML; MG/5ML
5 SYRUP ORAL 4 TIMES DAILY PRN
Qty: 118 ML | Refills: 0 | Status: SHIPPED | OUTPATIENT
Start: 2024-04-02

## 2024-04-02 NOTE — LETTER
April 2, 2024     Patient: Amy Brown   YOB: 2000   Date of Visit: 4/2/2024       To Whom it May Concern:    Amy Brown was seen in my clinic on 4/2/2024. She has been sick for 2 days and return to work on Wednesday 4/3/2024.            Sincerely,          GAGE Meneses        CC: No Recipients

## 2024-04-02 NOTE — E-VISIT ESCALATED
Chief Complaint: Cold, flu, COVID, sinus, hay fever, or seasonal allergies   Patient was shown the following escalation message:   Some conditions need a visit with a healthcare provider   Based on the description of your cough, your condition may be more complicated. You should speak with a provider to get care.   ----------   Patient Interview Transcript:   Which of these symptoms are bothering you? Select all that apply.    Cough    Shortness of breath    Runny nose    Itchy nose or sneezing    Hoarse voice or loss of voice    Headache    Sweats    Muscle or body aches    Diarrhea   Not selected:    Stuffed-up nose or sinuses    Itchy or watery eyes    Loss of smell or taste    Sore throat    Fever    Chills    Fatigue or tiredness    Nausea or vomiting    I don't have any of these symptoms   When did your current symptoms start? Select one.    6 to 9 days ago   Not selected:    Less than 48 hours ago    3 to 5 days ago    10 to 14 days ago    2 to 3 weeks ago    3 to 4 weeks ago    More than a month ago   Did your symptoms come on suddenly or gradually? Select one.    Gradually   Not selected:    Suddenly    I'm not sure   Have your symptoms improved at all since they began? Select one.    Yes, but they haven't gone away completely   Not selected:    Yes, but then they came back worse than before    No   Since your current symptoms started, have you been tested for COVID-19? This includes home self-tests as well as nose swab or saliva tests done at a doctor's office, lab, or testing site. Select one.    No   Not selected:    Yes   Taking a home COVID test can help your provider give you the best care. - If you have a COVID test kit, take the test now before continuing this interview. - If you choose not to take a test or don't have one, you should still continue this interview. Your provider can still help you get care. Do you have a COVID test kit? Select one.    No, I don't have a test kit   Not selected:    " Yes, and I'll take a test now    Yes, but I prefer not to take a test now   Has anyone in your household tested positive for COVID-19 in the past 14 days? Select one.    No   Not selected:    Yes   In the last 14 days, have you had close contact with someone who has COVID-19? \"Close contact\" means any of these: - Caring for someone with COVID-19. - Being within 6 feet of someone with COVID-19 for a total of at least 15 minutes over a 24-hour period. For example, three 5-minute exposures for a total of 15 minutes. - Being in direct contact with respiratory droplets from someone with COVID-19 (being coughed on, kissing, sharing utensils). Select one.    No, not that I know of   Not selected:    Yes, a confirmed case    Yes, a suspected case   Have you gotten the [id number] updated COVID-19 vaccine? This means either the updated Pfizer-BioNTech or Moderna vaccine after September 12, 2023; or the updated Novavax vaccine after October 3, 2023. Select one.    No   Not selected:    Yes   How would you describe your shortness of breath? Sometimes shortness of breath can be a sign of a more serious condition. Select one.    Mild (about what I normally have with a cold)   Not selected:    Moderate (it's bad, but I can still do simple things like get dressed, bathe, or comb my hair)    Severe (it's so bad that I can't do simple things like get dressed, bathe, or comb my hair)   We'd like to find out more about your shortness of breath. Try to say the following sentence out loud: \"I went to the store today to buy bread, milk, and eggs.\" Can you get to the end of the sentence without stopping for breath? If not, you may need emergency care.    Yes   Not selected:    No   Are you able to take a full, deep breath? A full, deep breath means inhaling for 3 to 4 seconds. If you can't do this, you may need to seek emergency care. Select one.    Yes   Not selected:    No   Since your symptoms started, have you felt dizzy? Select one.   "  Yes, but I can still do my regular daily activities   Not selected:    Yes, and it makes it hard to stand, walk, or do daily activities    No   Do you have chest pain? You might also feel it as discomfort, aching, tightness, or squeezing in the chest. Select one.    No   Not selected:    Yes   Do you have any of these devices at home? Select all that apply.    Apple Watch or other smart watch   Not selected:    A home pulse oximeter    Home blood pressure monitor    FitBit or other smart wristband    Other wearable device    No   Have you been wearing the device since your symptoms started? Select one.    Yes   Not selected:    No   Has the device alerted you about a higher than normal heart rate? This doesn't include alerts received during exercise. Select one.    No   Not selected:    Yes, 120 beats per minute or higher    Yes, but less than 120 beats per minute   You mentioned having a headache. On a scale of 1 to 10, how severe is your headache pain? Select one.    Moderate (4 to 6)   Not selected:    Mild (1 to 3)    Severe (7 to 9)    Unbearable (10)    The worst headache of my life (10+)   Do you cough so hard that it's made you gag or vomit? By gag, we mean has your coughing made you choke or dry heave? Select all that apply.    Yes, my coughing has made me gag    Yes, my coughing has made me vomit   Not selected:    No   Does your cough come in spasms of 10 to 20 coughs in a row, followed by a loud whoop when breathing in? Select one.    Yes   Not selected:    No   ----------   Medical history   Medical history data does not currently exist for this patient.

## 2024-04-02 NOTE — LETTER
April 2, 2024     Patient: Amy Brown   YOB: 2000   Date of Visit: 4/2/2024       To Whom it May Concern:    Amy Brown was seen in my clinic on 4/2/2024. She was ill on Monday and can return to work on Wednesday 4/3/2024.            Sincerely,          GAGE Meneses        CC: No Recipients

## 2024-04-02 NOTE — PROGRESS NOTES
You have chosen to receive care through a telehealth visit.  Do you consent to use a video/audio connection for your medical care today? Yes     HPI  Amy Brown is a 23 y.o. female  presents with complaint of over a week h/o cough with chest congeston, nasal congestion with yellow discharge, mild shortness of breath during coughing spells and post nasal discharge. She reports no fever, sore throat, body aches or headaches. She is taking Flonase and Claritin.     Review of Systems   Constitutional: Negative.    HENT:  Positive for congestion, postnasal drip and rhinorrhea. Negative for sinus pressure, sinus pain and sore throat.    Respiratory:  Positive for cough and shortness of breath. Negative for wheezing and stridor.    Cardiovascular: Negative.    Gastrointestinal: Negative.    Musculoskeletal: Negative.    Allergic/Immunologic: Positive for environmental allergies.   Neurological: Negative.    Hematological: Negative.    Psychiatric/Behavioral: Negative.         Past Medical History:   Diagnosis Date    Allergic     Anemia     Anxiety     ASD (atrial septal defect)     closed on its own at 1 year of age    Depression     Headache 3/2/2021    Hiatal hernia     Obesity        Family History   Problem Relation Age of Onset    Anxiety disorder Mother     Depression Mother     Mental illness Mother     Thyroid disease Mother     Hyperlipidemia Father     Hypertension Maternal Grandmother     Arthritis Maternal Grandmother     Hypertension Maternal Grandfather     Alcohol abuse Maternal Grandfather     Cancer Paternal Grandmother     Diabetes Paternal Grandmother     COPD Paternal Grandfather        Social History     Socioeconomic History    Marital status: Single   Tobacco Use    Smoking status: Never     Passive exposure: Never    Smokeless tobacco: Never   Vaping Use    Vaping status: Never Used   Substance and Sexual Activity    Alcohol use: Yes     Alcohol/week: 1.0 standard drink of alcohol     Types: 1  Drinks containing 0.5 oz of alcohol per week    Drug use: No    Sexual activity: Yes     Partners: Male     Birth control/protection: Condom, Birth control pill, OCP         There were no vitals taken for this visit.    PHYSICAL EXAM  Physical Exam   Constitutional: She is oriented to person, place, and time. She appears well-developed and well-nourished. She does not have a sickly appearance. She does not appear ill. No distress.   HENT:   Head: Normocephalic and atraumatic.   Pulmonary/Chest: Effort normal.  No respiratory distress. She no audible wheeze...  Neurological: She is alert and oriented to person, place, and time.   Psychiatric: She has a normal mood and affect.   Vitals reviewed.      Diagnoses and all orders for this visit:    1. Upper respiratory tract infection, unspecified type (Primary)  -     promethazine-dextromethorphan (PROMETHAZINE-DM) 6.25-15 MG/5ML syrup; Take 5 mL by mouth 4 (Four) Times a Day As Needed for Cough.  Dispense: 118 mL; Refill: 0    2. Seasonal allergies  -     brompheniramine-pseudoephedrine-DM 30-2-10 MG/5ML syrup; Take 5 mL by mouth 4 (Four) Times a Day As Needed for Cough, Congestion or Allergies for up to 5 days.  Dispense: 118 mL; Refill: 0    Increase fluids and rest.       FOLLOW-UP  As discussed during visit with [facility], if symptoms worsen or fail to improve, follow-up with PCP/Urgent Care/Emergency Department.    Patient verbalizes understanding of medications, instructions for treatment and follow-up.    GAGE Meneses  04/02/2024  12:30 EDT    The use of a video visit has been reviewed with the patient and verbal informed consent has been obtained. Myself and Amy Brown participated in this visit. The patient is located in [facility], and I am located in [place], [place]. [employer] and [employer]  were utilized.

## 2024-04-05 ENCOUNTER — E-VISIT (OUTPATIENT)
Dept: ADMINISTRATIVE | Facility: OTHER | Age: 24
End: 2024-04-05
Payer: COMMERCIAL

## 2024-04-05 ENCOUNTER — TELEMEDICINE (OUTPATIENT)
Dept: FAMILY MEDICINE CLINIC | Facility: TELEHEALTH | Age: 24
End: 2024-04-05
Payer: COMMERCIAL

## 2024-04-05 DIAGNOSIS — J20.9 ACUTE BRONCHITIS, UNSPECIFIED ORGANISM: Primary | ICD-10-CM

## 2024-04-05 RX ORDER — LORATADINE 10 MG/1
10 CAPSULE, LIQUID FILLED ORAL DAILY
COMMUNITY
Start: 2023-05-02

## 2024-04-05 RX ORDER — TIRZEPATIDE 10 MG/.5ML
10 INJECTION, SOLUTION SUBCUTANEOUS WEEKLY
COMMUNITY
Start: 2024-03-24

## 2024-04-05 RX ORDER — METHYLPREDNISOLONE 4 MG/1
TABLET ORAL
Qty: 21 TABLET | Refills: 0 | Status: SHIPPED | OUTPATIENT
Start: 2024-04-05

## 2024-04-05 RX ORDER — TIRZEPATIDE 2.5 MG/.5ML
2.5 INJECTION, SOLUTION SUBCUTANEOUS WEEKLY
COMMUNITY
Start: 2024-03-22

## 2024-04-05 RX ORDER — ALBUTEROL SULFATE 90 UG/1
2 AEROSOL, METERED RESPIRATORY (INHALATION)
Qty: 18 G | Refills: 0 | Status: SHIPPED | OUTPATIENT
Start: 2024-04-05

## 2024-04-05 RX ORDER — AZELASTINE HYDROCHLORIDE 137 UG/1
1 SPRAY, METERED NASAL DAILY
COMMUNITY
Start: 2024-03-29

## 2024-04-05 NOTE — LETTER
April 5, 2024     Patient: Amy Brown   YOB: 2000   Date of Visit: 4/5/2024       To Whom It May Concern:    It is my medical opinion that Amy Brown may return to work on Monday 4/8/2024.       Sincerely,    GAGE Alvares     URGENT CARE VIDEO VISIT PROVIDER    CC: No Recipients         No

## 2024-04-05 NOTE — E-VISIT ESCALATED
Chief Complaint: Cold, flu, COVID, sinus, hay fever, or seasonal allergies   Patient was shown the following escalation message:   Some conditions need a visit with a healthcare provider   Based on the description of your cough, your condition may be more complicated. You should speak with a provider to get care.   ----------   Patient Interview Transcript:   Which of these symptoms are bothering you? Select all that apply.    Cough    Sore throat    Hoarse voice or loss of voice    Headache   Not selected:    Shortness of breath    Stuffed-up nose or sinuses    Runny nose    Itchy or watery eyes    Itchy nose or sneezing    Loss of smell or taste    Fever    Sweats    Chills    Muscle or body aches    Fatigue or tiredness    Nausea or vomiting    Diarrhea    I don't have any of these symptoms   When did your current symptoms start? Select one.    10 to 14 days ago   Not selected:    Less than 48 hours ago    3 to 5 days ago    6 to 9 days ago    2 to 3 weeks ago    3 to 4 weeks ago    More than a month ago   Did your symptoms come on suddenly or gradually? Select one.    Gradually   Not selected:    Suddenly    I'm not sure   Have your symptoms improved at all since they began? Select one.    Yes, but then they came back worse than before   Not selected:    Yes, but they haven't gone away completely    No   It sounds like you felt better, but now you feel sick again. How long did you feel better? Select one.    Less than 1 day   Not selected:    1 to 2 days    More than 2 days   Since your current symptoms started, have you been tested for COVID-19? This includes home self-tests as well as nose swab or saliva tests done at a doctor's office, lab, or testing site. Select one.    No   Not selected:    Yes   Taking a home COVID test can help your provider give you the best care. - If you have a COVID test kit, take the test now before continuing this interview. - If you choose not to take a test or don't have one,  "you should still continue this interview. Your provider can still help you get care. Do you have a COVID test kit? Select one.    No, I don't have a test kit   Not selected:    Yes, and I'll take a test now    Yes, but I prefer not to take a test now   Has anyone in your household tested positive for COVID-19 in the past 14 days? Select one.    No   Not selected:    Yes   In the last 14 days, have you had close contact with someone who has COVID-19? \"Close contact\" means any of these: - Caring for someone with COVID-19. - Being within 6 feet of someone with COVID-19 for a total of at least 15 minutes over a 24-hour period. For example, three 5-minute exposures for a total of 15 minutes. - Being in direct contact with respiratory droplets from someone with COVID-19 (being coughed on, kissing, sharing utensils). Select one.    No, not that I know of   Not selected:    Yes, a confirmed case    Yes, a suspected case   Have you gotten the [id number] updated COVID-19 vaccine? This means either the updated Pfizer-BioNTech or Moderna vaccine after September 12, 2023; or the updated Novavax vaccine after October 3, 2023. Select one.    No   Not selected:    Yes   Since your symptoms started, have you felt dizzy? Select one.    No   Not selected:    Yes, but I can still do my regular daily activities    Yes, and it makes it hard to stand, walk, or do daily activities   Do you have chest pain? You might also feel it as discomfort, aching, tightness, or squeezing in the chest. Select one.    Yes   Not selected:    No   Does your chest hurt only when you cough? Select one.    Yes   Not selected:    No, it hurts even when I'm not coughing   Do you have any of these devices at home? Select all that apply.    Apple Watch or other smart watch   Not selected:    A home pulse oximeter    Home blood pressure monitor    FitBit or other smart wristband    Other wearable device    No   Have you been wearing the device since your symptoms " started? Select one.    Yes   Not selected:    No   Has the device alerted you about a higher than normal heart rate? This doesn't include alerts received during exercise. Select one.    Yes, but less than 120 beats per minute   Not selected:    Yes, 120 beats per minute or higher    No   You mentioned having a headache. On a scale of 1 to 10, how severe is your headache pain? Select one.    Mild (1 to 3)   Not selected:    Moderate (4 to 6)    Severe (7 to 9)    Unbearable (10)    The worst headache of my life (10+)   Do you cough so hard that it's made you gag or vomit? By gag, we mean has your coughing made you choke or dry heave? Select all that apply.    Yes, my coughing has made me gag    Yes, my coughing has made me vomit   Not selected:    No   Does your cough come in spasms of 10 to 20 coughs in a row, followed by a loud whoop when breathing in? Select one.    Yes   Not selected:    No   ----------   Medical history   Medical history data does not currently exist for this patient.

## 2024-04-05 NOTE — PROGRESS NOTES
CHIEF COMPLAINT  Chief Complaint   Patient presents with    Cough         HPI  Amy Brown is a 23 y.o. female  presents with complaint of cough she has had for 10 days. She does have periods when she feels short of breath when coughing. Upper respiratory symptoms have resolved.     Review of Systems   Constitutional:  Positive for fatigue. Negative for chills, diaphoresis and fever.   HENT:  Positive for sore throat. Negative for congestion, postnasal drip, rhinorrhea, sinus pressure and sneezing.    Respiratory:  Positive for cough, chest tightness, shortness of breath and wheezing.    Cardiovascular:  Negative for chest pain.   Gastrointestinal:  Negative for diarrhea, nausea and vomiting.   Musculoskeletal:  Negative for myalgias.   Neurological:  Negative for headaches.       Past Medical History:   Diagnosis Date    Allergic     Anemia     Anxiety     ASD (atrial septal defect)     closed on its own at 1 year of age    Depression     Headache 3/2/2021    Hiatal hernia     Obesity        Family History   Problem Relation Age of Onset    Anxiety disorder Mother     Depression Mother     Mental illness Mother     Thyroid disease Mother     Hyperlipidemia Father     Hypertension Maternal Grandmother     Arthritis Maternal Grandmother     Hypertension Maternal Grandfather     Alcohol abuse Maternal Grandfather     Cancer Paternal Grandmother     Diabetes Paternal Grandmother     COPD Paternal Grandfather        Social History     Socioeconomic History    Marital status: Single   Tobacco Use    Smoking status: Never     Passive exposure: Never    Smokeless tobacco: Never   Vaping Use    Vaping status: Never Used   Substance and Sexual Activity    Alcohol use: Yes     Alcohol/week: 1.0 standard drink of alcohol     Types: 1 Drinks containing 0.5 oz of alcohol per week    Drug use: No    Sexual activity: Yes     Partners: Male     Birth control/protection: Condom, Birth control pill, OCP         LMP 03/08/2024  (Approximate)   Breastfeeding No     PHYSICAL EXAM  Physical Exam   Constitutional: She is oriented to person, place, and time. She appears well-developed and well-nourished. She does not have a sickly appearance. She does not appear ill. No distress.   HENT:   Head: Normocephalic and atraumatic.   Eyes: EOM are normal.   Neck: Neck normal appearance.  Pulmonary/Chest: Effort normal.  No respiratory distress.  Dry breathless cough noted. No distress when not coughing    Neurological: She is alert and oriented to person, place, and time.   Skin: Skin is dry. No erythema. No pallor.   Color pink    Psychiatric: She has a normal mood and affect.           Diagnoses and all orders for this visit:    1. Acute bronchitis, unspecified organism (Primary)    Other orders  -     methylPREDNISolone (MEDROL) 4 MG dose pack; Take as directed on package instructions. Start in the morning  Dispense: 21 tablet; Refill: 0  -     albuterol sulfate  (90 Base) MCG/ACT inhaler; Inhale 2 puffs 4 (Four) Times a Day. For 2-3 days and then every 4 hours as needed for cough and shortness of breath.  Dispense: 18 g; Refill: 0  -     dextromethorphan 15 MG/5ML syrup; Take 10 mL by mouth 3 (Three) Times a Day As Needed for Cough (daytime cough).  Dispense: 120 mL; Refill: 0    Stop brompheniramine-pseudoephedrine-DM.   Use promethazine DM at night  Take dextromethorphan during the day.   If albuterol does not help cough and breathlessness with cough today, start the methylprednisolone tomorrow morning.     The use of a video visit has been reviewed with the patient and verbal informed consent has been obtained. Myself and Amy Brown participated in this visit. The patient is located in [address] [place], [place] [id number]. I am located in [place], [place]. [employer] and [employer] were utilized.       Note Disclaimer: At [facility], we believe that sharing information builds trust and better   relationships. You are receiving this note  because you recently visited [facility]. It is possible you   will see health information before a provider has talked with you about it. This kind of information can   be easy to misunderstand. To help you fully understand what it means for your health, we urge you to   discuss this note with your provider.    GAGE Alvares  04/05/2024  10:30 EDT

## 2024-04-05 NOTE — PATIENT INSTRUCTIONS
Stop brompheniramine-pseudoephedrine-DM.   Use promethazine DM at night  Take dextromethorphan during the day.   If albuterol does not help cough and breathlessness with cough today, start the methylprednisolone tomorrow morning.     Drink plenty of water  Over the counter pain relievers okay   If symptoms do not improve in 3-5 days follow up with your primary care provider or urgent care  If symptoms worsen follow up with urgent care or the emergency room      Acute Bronchitis, Adult    Acute bronchitis is when air tubes in the lungs (bronchi) suddenly get swollen. The condition can make it hard for you to breathe. In adults, acute bronchitis usually goes away within 2 weeks. A cough caused by bronchitis may last up to 3 weeks. Smoking, allergies, and asthma can make the condition worse.  What are the causes?  Germs that cause cold and flu (viruses). The most common cause of this condition is the virus that causes the common cold.  Bacteria.  Substances that bother (irritate) the lungs, including:  Smoke from cigarettes and other types of tobacco.  Dust and pollen.  Fumes from chemicals, gases, or burned fuel.  Indoor or outdoor air pollution.  What increases the risk?  A weak body's defense system. This is also called the immune system.  Any condition that affects your lungs and breathing, such as asthma.  What are the signs or symptoms?  A cough.  Coughing up clear, yellow, or green mucus.  Making high-pitched whistling sounds when you breathe, most often when you breathe out (wheezing).  Runny or stuffy nose.  Having too much mucus in your lungs (chest congestion).  Shortness of breath.  Body aches.  A sore throat.  How is this treated?  Acute bronchitis may go away over time without treatment. Your doctor may tell you to:  Drink more fluids. This will help thin your mucus so it is easier to cough up.  Use a device that gets medicine into your lungs (inhaler).  Use a vaporizer or a humidifier. These are machines  that add water to the air. This helps with coughing and poor breathing.  Take a medicine that thins mucus and helps clear it from your lungs.  Take a medicine that prevents or stops coughing.  It is not common to take an antibiotic medicine for this condition.  Follow these instructions at home:    Take over-the-counter and prescription medicines only as told by your doctor.  Use an inhaler, vaporizer, or humidifier as told by your doctor.  Take two teaspoons (10 mL) of honey at bedtime. This helps lessen your coughing at night.  Drink enough fluid to keep your pee (urine) pale yellow.  Do not smoke or use any products that contain nicotine or tobacco. If you need help quitting, ask your doctor.  Get a lot of rest.  Return to your normal activities when your doctor says that it is safe.  Keep all follow-up visits.  How is this prevented?    Wash your hands often with soap and water for at least 20 seconds. If you cannot use soap and water, use hand .  Avoid contact with people who have cold symptoms.  Try not to touch your mouth, nose, or eyes with your hands.  Avoid breathing in smoke or chemical fumes.  Make sure to get the flu shot every year.  Contact a doctor if:  Your symptoms do not get better in 2 weeks.  You have trouble coughing up the mucus.  Your cough keeps you awake at night.  You have a fever.  Get help right away if:  You cough up blood.  You have chest pain.  You have very bad shortness of breath.  You faint or keep feeling like you are going to faint.  You have a very bad headache.  Your fever or chills get worse.  These symptoms may be an emergency. Get help right away. Call your local emergency services (911 in the [place]  Do not wait to see if the symptoms will go away.  Do not drive yourself to the hospital.  Summary  Acute bronchitis is when air tubes in the lungs (bronchi) suddenly get swollen. In adults, acute bronchitis usually goes away within 2 weeks.  Drink more fluids. This  will help thin your mucus so it is easier to cough up.  Take over-the-counter and prescription medicines only as told by your doctor.  Contact a doctor if your symptoms do not improve after 2 weeks of treatment.  This information is not intended to replace advice given to you by your health care provider. Make sure you discuss any questions you have with your health care provider.  Document Revised: 04/20/2022 Document Reviewed: 04/20/2022  Elsevier Patient Education © 2023 [employer]

## 2024-04-15 RX ORDER — HYDROXYZINE HYDROCHLORIDE 10 MG/1
10 TABLET, FILM COATED ORAL 3 TIMES DAILY PRN
Qty: 30 TABLET | Refills: 0 | Status: SHIPPED | OUTPATIENT
Start: 2024-04-15

## 2024-04-15 NOTE — TELEPHONE ENCOUNTER
Rx Refill Note  Requested Prescriptions     Pending Prescriptions Disp Refills    hydrOXYzine (ATARAX) 10 MG tablet [Pharmacy Med Name: HYDROXYZINE HCL 10 MG TABLET] 30 tablet 0     Sig: TAKE 1 TABLET BY MOUTH 3 TIMES A DAY AS NEEDED FOR ANXIETY.      Last office visit with prescribing clinician: 1/24/2024   Last telemedicine visit with prescribing clinician: Visit date not found   Next office visit with prescribing clinician: 5/28/2024                         Would you like a call back once the refill request has been completed: [] Yes [] No    If the office needs to give you a call back, can they leave a voicemail: [] Yes [] No    Bryan Ferrer MA  04/15/24, 07:55 EDT

## 2024-04-16 RX ORDER — NORGESTIMATE AND ETHINYL ESTRADIOL 7DAYSX3 28
1 KIT ORAL DAILY
Qty: 84 TABLET | Refills: 0 | Status: SHIPPED | OUTPATIENT
Start: 2024-04-16

## 2024-04-16 NOTE — TELEPHONE ENCOUNTER
Rx Refill Note  Requested Prescriptions     Pending Prescriptions Disp Refills    Tri-Estarylla 0.18/0.215/0.25 MG-35 MCG per tablet [Pharmacy Med Name: TRI-ESTARYLLA TABLET] 84 tablet 0     Sig: TAKE 1 TABLET BY MOUTH EVERY DAY      Last office visit with prescribing clinician: 1/24/2024   Last telemedicine visit with prescribing clinician: Visit date not found   Next office visit with prescribing clinician: 5/28/2024                         Would you like a call back once the refill request has been completed: [] Yes [] No    If the office needs to give you a call back, can they leave a voicemail: [] Yes [] No    Bryan Ferrer MA  04/16/24, 07:54 EDT

## 2024-05-06 ENCOUNTER — TELEPHONE (OUTPATIENT)
Dept: FAMILY MEDICINE CLINIC | Facility: CLINIC | Age: 24
End: 2024-05-06

## 2024-05-06 NOTE — TELEPHONE ENCOUNTER
Caller: Amy Brown    Relationship to patient: Self    Best call back number: [phone]     Chief complaint: ABDOMINAL PAIN WITH BLOOD IN STOOL     Patient directed to call 911 or go to their nearest emergency room.     Patient verbalized understanding: [x] Yes  [] No  If no, why?

## 2024-05-07 ENCOUNTER — TELEPHONE (OUTPATIENT)
Dept: FAMILY MEDICINE CLINIC | Facility: CLINIC | Age: 24
End: 2024-05-07
Payer: COMMERCIAL

## 2024-05-08 ENCOUNTER — TELEPHONE (OUTPATIENT)
Dept: FAMILY MEDICINE CLINIC | Facility: CLINIC | Age: 24
End: 2024-05-08

## 2024-05-08 NOTE — TELEPHONE ENCOUNTER
Spoke with patient patient was seen in the ER for Diverticulitis and given and Antibiotic for treatment. Told to follow up with PCP, patient has an appointment on 05/28/2024.

## 2024-05-08 NOTE — TELEPHONE ENCOUNTER
[facility] staff attempted to follow warm transfer process and was unsuccessful     Caller: Amy Brown    Relationship to patient: Self    Best call back number: [phone]    Patient is needing: PATIENT RETURNING CALL TO [facility].

## 2024-05-28 RX ORDER — AZELASTINE HYDROCHLORIDE 137 UG/1
SPRAY, METERED NASAL
Qty: 30 ML | Refills: 3 | Status: SHIPPED | OUTPATIENT
Start: 2024-05-28

## 2024-05-28 NOTE — TELEPHONE ENCOUNTER
Rx Refill Note  Requested Prescriptions     Pending Prescriptions Disp Refills    Azelastine HCl 137 MCG/SPRAY solution [Pharmacy Med Name: AZELASTINE 0.1% (137 MCG) SPRY]  3     Sig: SPRAY 2 SPRAYS INTO INTO EACH NOSTRIL TWICE A DAY AS DIRECTED BY PROVIDER      Last office visit with prescribing clinician: 01/24/2024  Last telemedicine visit with prescribing clinician: Visit date not found   Next office visit with prescribing clinician: Visit date not found                         Would you like a call back once the refill request has been completed: [] Yes [] No    If the office needs to give you a call back, can they leave a voicemail: [] Yes [] No    Jayant Gill Rep  05/28/24, 09:10 EDT

## 2024-06-20 RX ORDER — HYDROXYZINE HYDROCHLORIDE 10 MG/1
10 TABLET, FILM COATED ORAL 3 TIMES DAILY PRN
Qty: 30 TABLET | Refills: 0 | Status: SHIPPED | OUTPATIENT
Start: 2024-06-20

## 2024-06-20 NOTE — TELEPHONE ENCOUNTER
Rx Refill Note  Requested Prescriptions     Pending Prescriptions Disp Refills    hydrOXYzine (ATARAX) 10 MG tablet 30 tablet 0     Sig: Take 1 tablet by mouth 3 (Three) Times a Day As Needed for Anxiety.      Last office visit with prescribing clinician: 1/24/2024   Last telemedicine visit with prescribing clinician: Visit date not found   Next office visit with prescribing clinician: 7/2/2024                         Would you like a call back once the refill request has been completed: [] Yes [] No    If the office needs to give you a call back, can they leave a voicemail: [] Yes [] No    Brandt Saab  06/20/24, 13:52 EDT

## 2024-07-02 ENCOUNTER — OFFICE VISIT (OUTPATIENT)
Dept: FAMILY MEDICINE CLINIC | Facility: CLINIC | Age: 24
End: 2024-07-02
Payer: COMMERCIAL

## 2024-07-02 VITALS
HEART RATE: 68 BPM | WEIGHT: 246.5 LBS | BODY MASS INDEX: 41.07 KG/M2 | DIASTOLIC BLOOD PRESSURE: 72 MMHG | TEMPERATURE: 98.2 F | OXYGEN SATURATION: 98 % | SYSTOLIC BLOOD PRESSURE: 128 MMHG | HEIGHT: 65 IN

## 2024-07-02 PROCEDURE — 99213 OFFICE O/P EST LOW 20 MIN: CPT | Performed by: INTERNAL MEDICINE

## 2024-07-02 NOTE — PROGRESS NOTES
"Chief Complaint  Office Visit    Subjective        Amy Brown presents to [facility]  History of Present Illness  Patient is here to follow-up on episode of colitis that occurred about 4 weeks after being on zepbound 2.5 mg weekly.  After taking zepbound 2.5 mg weekly she developed colitis that was dx based bloody diarrhea that then went to just bloody BM and a CT that showed colitis.  She was placed on an antibiotic and it clear/stopped within 5 days. Her f/u GI appt is July 16th.  She stopped the zebound during her recovery and started it back a month later.  The day after her first 2.5 mg shot, she had n/v after eating steak, broccoli, sour cream, and cheese.  She has since not taken zepbound but is interested in medical weight loss therapies.  She has not tried semaglutide shots but the drops have been ineffective for weight loss.  She did lose about 15 pounds on zepbound.    No fhx of colitis  Objective   Vital Signs:  /72 (BP Location: Left arm, Patient Position: Sitting, Cuff Size: Adult)   Pulse 68   Temp 98.2 °F (36.8 °C)   Ht 165.1 cm (65\")   Wt 112 kg (246 lb 8 oz)   SpO2 98%   BMI 41.02 kg/m²   Estimated body mass index is 41.02 kg/m² as calculated from the following:    Height as of this encounter: 165.1 cm (65\").    Weight as of this encounter: 112 kg (246 lb 8 oz).     CT Abdomen Pelvis With Contrast (05/06/2024 16:10)           Physical Exam  Vitals and nursing note reviewed.   Constitutional:       Appearance: Normal appearance. She is well-developed.   HENT:      Head: Normocephalic and atraumatic.      Right Ear: External ear normal.      Left Ear: External ear normal.   Eyes:      Extraocular Movements: Extraocular movements intact.      Conjunctiva/sclera: Conjunctivae normal.   Neck:      Vascular: No carotid bruit.   Cardiovascular:      Rate and Rhythm: Normal rate and regular rhythm.      Heart sounds: Normal heart sounds.      Comments: No " bruits  Pulmonary:      Effort: Pulmonary effort is normal. No respiratory distress.      Breath sounds: Normal breath sounds. No stridor. No wheezing, rhonchi or rales.   Chest:      Chest wall: No tenderness.   Abdominal:      General: Bowel sounds are normal. There is no distension.      Palpations: Abdomen is soft. There is no mass.      Tenderness: There is no abdominal tenderness. There is no guarding or rebound.      Hernia: No hernia is present.   Musculoskeletal:      Cervical back: Neck supple.      Right lower leg: No edema.      Left lower leg: No edema.   Lymphadenopathy:      Cervical: No cervical adenopathy.   Skin:     General: Skin is warm.   Neurological:      Mental Status: She is alert and oriented to person, place, and time. Mental status is at baseline.   Psychiatric:         Mood and Affect: Mood normal.         Behavior: Behavior normal.         Thought Content: Thought content normal.         Judgment: Judgment normal.      Result Review :                     Assessment and Plan     Diagnoses and all orders for this visit:    1. BMI 40.0-44.9, adult (Primary)  -     Ambulatory Referral to Bariatric Surgery      Patient and I had a good discussion about the medical management side of the bariatric surgery team.  I think she would be a good candidate for the medical management approach.  I like the idea that she would work with a dietitian as well as a .  I think it would be okay to try the semaglutide injections.  We did discuss how important it is to monitor dietary intake of fat and portion size especially after taking the shot for the first few days.  I have placed the referral and she will let me know how it is going.  I think she has a physical coming up in 6 months.       Follow Up     No follow-ups on file.  Patient was given instructions and counseling regarding her condition or for health maintenance advice. Please see specific information pulled into the AVS if  appropriate.         Answers submitted by the patient for this visit:  Other (Submitted on 7/1/2024)  Please describe your symptoms.: Physical/ weight gain  Have you had these symptoms before?: Yes  How long have you been having these symptoms?: Greater than 2 weeks  Primary Reason for Visit (Submitted on 7/1/2024)  What is the primary reason for your visit?: Other

## 2024-07-30 NOTE — TELEPHONE ENCOUNTER
Rx Refill Note  Requested Prescriptions     Pending Prescriptions Disp Refills    Tri-Estarylla 0.18/0.215/0.25 MG-35 MCG per tablet [Pharmacy Med Name: TRI-ESTARYLLA TABLET] 84 tablet 0     Sig: TAKE 1 TABLET BY MOUTH EVERY DAY      Last office visit with prescribing clinician: 7/2/2024   Last telemedicine visit with prescribing clinician: Visit date not found   Next office visit with prescribing clinician: 7/30/2024                         Would you like a call back once the refill request has been completed: [] Yes [] No    If the office needs to give you a call back, can they leave a voicemail: [] Yes [] No    Bryan Ferrer MA  07/30/24, 11:07 EDT

## 2024-07-30 NOTE — TELEPHONE ENCOUNTER
Rx Refill Note  Requested Prescriptions     Pending Prescriptions Disp Refills    norgestimate-ethinyl estradiol (Tri-Estarylla) 0.18/0.215/0.25 MG-35 MCG per tablet 84 tablet 0     Sig: Take 1 tablet by mouth Daily.    hydrOXYzine (ATARAX) 10 MG tablet 30 tablet 0     Sig: Take 1 tablet by mouth 3 (Three) Times a Day As Needed for Anxiety.      Last office visit with prescribing clinician: 7/2/2024   Last telemedicine visit with prescribing clinician: Visit date not found   Next office visit with prescribing clinician: 12/3/2024                         Would you like a call back once the refill request has been completed: [] Yes [] No    If the office needs to give you a call back, can they leave a voicemail: [] Yes [] No    Bryan Ferrer MA  07/30/24, 11:08 EDT

## 2024-07-31 RX ORDER — NORGESTIMATE AND ETHINYL ESTRADIOL 7DAYSX3 28
1 KIT ORAL DAILY
Qty: 84 TABLET | Refills: 0 | Status: SHIPPED | OUTPATIENT
Start: 2024-07-31

## 2024-07-31 RX ORDER — NORGESTIMATE AND ETHINYL ESTRADIOL 7DAYSX3 28
1 KIT ORAL DAILY
Qty: 84 TABLET | Refills: 0 | OUTPATIENT
Start: 2024-07-31

## 2024-07-31 RX ORDER — HYDROXYZINE HYDROCHLORIDE 10 MG/1
10 TABLET, FILM COATED ORAL 3 TIMES DAILY PRN
Qty: 30 TABLET | Refills: 0 | Status: SHIPPED | OUTPATIENT
Start: 2024-07-31

## 2024-08-27 ENCOUNTER — CONSULT (OUTPATIENT)
Dept: BARIATRICS/WEIGHT MGMT | Facility: CLINIC | Age: 24
End: 2024-08-27
Payer: COMMERCIAL

## 2024-08-27 VITALS
HEIGHT: 65 IN | DIASTOLIC BLOOD PRESSURE: 94 MMHG | HEART RATE: 77 BPM | SYSTOLIC BLOOD PRESSURE: 132 MMHG | WEIGHT: 251 LBS | TEMPERATURE: 97.7 F | BODY MASS INDEX: 41.82 KG/M2

## 2024-08-27 DIAGNOSIS — E66.01 OBESITY, CLASS III, BMI 40-49.9 (MORBID OBESITY): Primary | ICD-10-CM

## 2024-08-27 DIAGNOSIS — Z71.3 WEIGHT LOSS COUNSELING, ENCOUNTER FOR: ICD-10-CM

## 2024-08-27 DIAGNOSIS — Z71.3 DIETARY COUNSELING: ICD-10-CM

## 2024-08-27 PROBLEM — F41.8 DEPRESSION WITH ANXIETY: Status: ACTIVE | Noted: 2017-03-01

## 2024-08-27 PROBLEM — K21.9 GERD (GASTROESOPHAGEAL REFLUX DISEASE): Status: ACTIVE | Noted: 2024-08-27

## 2024-08-27 PROBLEM — E66.813 OBESITY, CLASS III, BMI 40-49.9 (MORBID OBESITY): Status: ACTIVE | Noted: 2024-08-27

## 2024-08-27 PROBLEM — R63.4 WEIGHT LOSS: Status: RESOLVED | Noted: 2017-03-01 | Resolved: 2024-08-27

## 2024-08-27 RX ORDER — SEMAGLUTIDE 0.25 MG/.5ML
0.25 INJECTION, SOLUTION SUBCUTANEOUS WEEKLY
Qty: 2 ML | Refills: 0 | COMMUNITY
Start: 2024-08-27

## 2024-08-27 RX ORDER — CALCIUM CARBONATE 500 MG/1
1 TABLET, CHEWABLE ORAL AS NEEDED
COMMUNITY

## 2024-08-27 RX ORDER — SEMAGLUTIDE 0.5 MG/.5ML
0.5 INJECTION, SOLUTION SUBCUTANEOUS WEEKLY
Qty: 2 ML | Refills: 0 | Status: SHIPPED | OUTPATIENT
Start: 2024-08-27 | End: 2024-09-24

## 2024-08-27 NOTE — PROGRESS NOTES
[facility] BARIATRIC [facility] BARIATRIC SURGERY  [address]  [facility] [id number] [phone]  [address]  [facility] [address] [phone]  Dept: [phone]  8/29/2024      Amy Brown.  [id number]  [id number]  2000  female      Chief Complaint of weight gain; unable to maintain weight loss    History of Present Illness:   Amy is a 24 y.o. female who presents today for evaluation, education and consultation regarding weight loss with the supervision of a medical specialist and registered dietitian.     Diet History:Amy has been overweight for at least 3 years, has been 35 pounds or more overweight for at least 2 years, has been 100 pounds or more overweight for 1 or more years and started dieting at age 20.  The most weight Amy lost was 20 pounds on reduced calorie/exercise and maintained the weight loss for 6 months. Amy describes her eating habits as volume, snacker and sweets eater. Amy Brown has tried Weight Watchers, Fasting, reduced calorie, and exercising among others with success of losing up to 20 pounds, but in each instance regained the weight.     See dietician documentation for complete history.  PCP gave her zepbound and she took it for 5 weeks and then started having issues with hematochezia. Work up and follow up with GI and did have colitis. Stopped zepbound and s/s improved. She started back and symptoms started back so she discontinued again.   Does well with her nutrition and has tried a lot of things (low calorie, low carb etc). She only drinks water. She tries to make healthy choices. GLP-1 helped with hunger and not grazing as much.   B- greek yogurt, small amount of granola and fresh fruit, L- trying to pack lunch instead of eating out and doing sandwich with keto or high fiber bread, meat and baked chips, might have a granola bar D- at home usually protein and vegetable      Bariatric Surgery  Evaluation: The patient is being seen for an initial visit for weight evaluation and education.     Bariatric Co-morbidities:  GERD and depression    Patient Active Problem List   Diagnosis   • Annual physical exam   • Depression with anxiety   • Acne vulgaris   • Obesity, Class III, BMI 40-49.9 (morbid obesity)   • Dietary counseling   • Weight loss counseling, encounter for   • GERD (gastroesophageal reflux disease)       Past Medical History:   Diagnosis Date   • Allergic    • Anemia    • Anxiety    • ASD (atrial septal defect)     closed on its own at 1 year of age   • Depression    • Diverticulosis 5/11    Diagnosed in ER   • Headache 3/2/2021   • Hiatal hernia    • History of medical problems     Colitis   • Obesity        Past Surgical History:   Procedure Laterality Date   • COLONOSCOPY  2024   • ENDOSCOPY  2007       Allergies   Allergen Reactions   • Cephalosporins Hives and Itching         Current Outpatient Medications:   •  Azelastine HCl 137 MCG/SPRAY solution, SPRAY 2 SPRAYS INTO INTO EACH NOSTRIL TWICE A DAY AS DIRECTED BY PROVIDER, Disp: 30 mL, Rfl: 3  •  hydrOXYzine (ATARAX) 10 MG tablet, Take 1 tablet by mouth 3 (Three) Times a Day As Needed for Anxiety., Disp: 30 tablet, Rfl: 0  •  Loratadine (Claritin) 10 MG capsule, Take 1 capsule by mouth Daily., Disp: , Rfl:   •  Tri-Estarylla 0.18/0.215/0.25 MG-35 MCG per tablet, TAKE 1 TABLET BY MOUTH EVERY DAY, Disp: 84 tablet, Rfl: 0  •  calcium carbonate (TUMS) 500 MG chewable tablet, Chew 1 tablet As Needed for Indigestion or Heartburn., Disp: , Rfl:   •  Semaglutide-Weight Management (Wegovy) 0.25 MG/0.5ML solution auto-injector, Inject 0.5 mL under the skin into the appropriate area as directed 1 (One) Time Per Week., Disp: 2 mL, Rfl: 0  •  Semaglutide-Weight Management (Wegovy) 0.5 MG/0.5ML solution auto-injector, Inject 0.5 mL under the skin into the appropriate area as directed 1 (One) Time Per Week for 28 days., Disp: 2 mL, Rfl: 0    Social  History     Socioeconomic History   • Marital status: Single   • Number of children: 0   Tobacco Use   • Smoking status: Never     Passive exposure: Never   • Smokeless tobacco: Never   Vaping Use   • Vaping status: Never Used   Substance and Sexual Activity   • Alcohol use: Yes     Alcohol/week: 1.0 standard drink of alcohol     Types: 1 Drinks containing 0.5 oz of alcohol per week     Comment: occ   • Drug use: Never   • Sexual activity: Defer     Partners: Male     Birth control/protection: Condom, Birth control pill, OCP       Family History   Problem Relation Age of Onset   • Anxiety disorder Mother    • Depression Mother    • Mental illness Mother    • Thyroid disease Mother    • Hyperlipidemia Father    • Cancer Father         Skin cancer   • Hypertension Father    • Sleep apnea Father    • Hypertension Maternal Grandmother    • Arthritis Maternal Grandmother    • Hypertension Maternal Grandfather    • Alcohol abuse Maternal Grandfather    • Cancer Paternal Grandmother         Colon and rectal cancer   • Diabetes Paternal Grandmother    • COPD Paternal Grandfather          Review of Systems:  Review of Systems   Constitutional:  Positive for unexpected weight change.   All other systems reviewed and are negative.    Physical Exam:  Vital Signs:  Weight: 114 kg (251 lb)   Body mass index is 41.22 kg/m².  Temp: 97.7 °F (36.5 °C)   Heart Rate: 77   BP: 132/94     Physical Exam  Vitals reviewed.   Constitutional:       Appearance: Normal appearance. She is well-developed. She is obese.   HENT:      Head: Normocephalic and atraumatic.   Cardiovascular:      Rate and Rhythm: Normal rate.   Pulmonary:      Effort: Pulmonary effort is normal.   Abdominal:      Palpations: Abdomen is soft.   Musculoskeletal:         General: Normal range of motion.   Skin:     General: Skin is warm and dry.   Neurological:      Mental Status: She is alert and oriented to person, place, and time.   Psychiatric:         Behavior:  Behavior normal.         Thought Content: Thought content normal.         Judgment: Judgment normal.        Assessment:         Amy Brown is a 24 y.o. year old female with an elevated BMI. Weight: 114 kg (251 lb), Body mass index is 41.22 kg/m². and weight related problems including GERD and depression.    Previous labs reviewed.    Amy Brown was screened for sleep apnea in our office today and based on their results she is low risk. The risks, as they relate to chronic hypercapnia r/t untreated OSCAR were discussed with the patient and She verbalized understanding.     The patient was advised to start a high protein, low fat and low carbohydrate diet  start routine exercise including but not limited to 150 minutes per week. The patient was given individualized information by our dietician along with handouts.     I think she is a good candidate for medical weight loss, and is interested in trying to track her macronutrient intake using a dietary toi.    Encounter Diagnoses   Name Primary?   • Obesity, Class III, BMI 40-49.9 (morbid obesity) Yes   • Dietary counseling    • Weight loss counseling, encounter for        Plan:    Patient will be evaluated by a bariatric dietician individually. We discussed weight loss program options regarding different diet plans and structures and discussed physiology related to hunger, fullness, satiety, and how different macronutrient's are processed and may contribute to the sustainability of her dietary plan.     Discussed risk vs benefit and patient would like to try glp-1 only therapy. Gave her sample of wegovy along with information. Reviewed indication, contraindication, dosing, titration, storage, administration and adverse effects. Discussed if she experiences the same symptoms to stop medication. Recommended she be sure to drink plenty of water, get enough fiber in daily and use miralax PRN to avoid constipation that could trigger symptoms.    Total time spent  during this encounter today was 60 minutes and includes preparing for the visit, reviewing tests, performing a medically appropriate examination and/or evaluations, counseling and educating the patient/family/caregiver, ordering medications, tests, or procedures, documenting information in the medical record, and independently interpreting results and communicating that information with the patient/family/caregiver  Isabel Henry, APRN  8/29/2024

## 2024-09-11 ENCOUNTER — TELEMEDICINE (OUTPATIENT)
Dept: BARIATRICS/WEIGHT MGMT | Facility: CLINIC | Age: 24
End: 2024-09-11
Payer: COMMERCIAL

## 2024-09-11 DIAGNOSIS — E66.01 OBESITY, CLASS III, BMI 40-49.9 (MORBID OBESITY): Primary | ICD-10-CM

## 2024-09-11 DIAGNOSIS — Z71.3 WEIGHT LOSS COUNSELING, ENCOUNTER FOR: ICD-10-CM

## 2024-09-11 PROCEDURE — 97802 MEDICAL NUTRITION INDIV IN: CPT

## 2024-09-11 NOTE — PROGRESS NOTES
"MEDICAL WEIGHT LOSS NUTRITION ASSESSMENT    Patient Name: Amy Brown    YOB: 2000   Age: 24 y.o.  Sex: female  MRN: [id number]     Patient presents today for telehealth service. This service was conducted via [employer]hart video visit. This provider is located at [address] [place] [address], [address]. Patient is being seen remotely at home.    You have chosen to receive care through a telehealth visit. Do you consent to use a video/audio connection for your care today? Yes  The visit included audio and video interaction. No technical issues occurred during this visit     ASSESSMENT    Anthropometric Measurements  Estimated body mass index is 41.22 kg/m² as calculated from the following:    Height as of 8/27/24: 166.2 cm (65.43\").    Weight as of 8/27/24: 114 kg (251 lb).    Medical History  Past Medical History:   Diagnosis Date    Allergic     Anemia     Anxiety     ASD (atrial septal defect)     closed on its own at 1 year of age    Depression     Diverticulosis 5/11    Diagnosed in ER    Headache 3/2/2021    Hiatal hernia     History of medical problems     Colitis    Obesity      Past Surgical History:   Procedure Laterality Date    COLONOSCOPY  2024    ENDOSCOPY  2007       Visit Narrative  Amy Brown is participating in medical weight loss program under the supervision of a medical specialist and registered dietitian. Spoke with patient today for nutrition follow-up. Today's weight was 248 lbs on home scale, which is a loss of 3 lbs since last visit. The patient states they are making efforts to follow the recommendations given at intake appointment including high lean protein, low carbohydrate and low fat diet. They are working on increasing fruits and vegetable intake, decreasing portion sizes of higher calorie foods and drinking 64 oz. water daily. Patient is working on reducing intake of fast foods, fried foods, sweets and high calorie beverages.    Patient states they have made positive changes " including focusing on increasing protein and decreasing caffeine and sugar. The patient admits to be struggling with easy/convenient meal ideas. She started Wegovy last Sunday and does report constipation and mild nausea.       Diet Review   Patient is eating 3 meals and 0-1 snacks daily.       24 Hour Recall   Breakfast: Greek yogurt with sprinkle of granola, cantaloupe    Lunch: Deli meat and yellow mustard on low carb bread, serving of baked chips or crackers    Dinner: Grilled pork chops, sauteed zucchini, 3 tbsp mashed potatoes, sugar free bbq sauce    Snacks: Snack size Skittles     Beverages: Water (fills 40 oz bottle 3 times)    Dietary restrictions: NKFA     Physical Activity   Planned exercise: Walking dog occasionally    Barriers to activity: Busy schedule      DIAGNOSIS     Nutrition problem statement: Overweight/obesity related to multifactorial biochemical, behavioral and environmental contributors to disease as evidenced by BMI 41.22 kg/m².    INTERVENTION    Nutrition education and coaching for behavior change completed. Program materials provided. Reviewed the appropriate dietary choices with the patient and provided guidance and suggestions for making necessary changes. Discussed sustainable habit changes and setting small, achievable goals that can be maintained long term. Recommended focus on eating protein first and aim for minimum of 70-80 grams per day. Limit or weigh/measure portions sizes for high fat and calorie foods including nuts. Discussed portion plate model for guidelines on putting together balanced meals. Suggested the option of keeping a food journal which will help patient become more aware of the nutritional value of food, establish starting point and identify areas for improvement. Increase fiber and fluid for constipation. Miralax PRN. Aim for at least 64 oz water daily. Be sure to do routine exercise, 150 minutes per week minimum, including both cardio and strength training.  Offered follow up for support and accountability.  Sent list of high protein snacks. Call to schedule follow-up with medical provider in 2 weeks.     MONITORING & EVALUATION    Short term goals:   Continue focus on 70-80 grams protein daily and balanced plate   Walk dog 1 mile for exercise     Total time spent during this encounter today exceeded 30 minutes and includes preparing for the visit, reviewing tests, counseling and educating the patient/family/caregiver and documenting information in the medical record.    Electronically signed by:  Lilly Frias RD   09/11/24 16:30 EDT

## 2024-10-11 RX ORDER — HYDROXYZINE HYDROCHLORIDE 10 MG/1
10 TABLET, FILM COATED ORAL 3 TIMES DAILY PRN
Qty: 30 TABLET | Refills: 0 | Status: SHIPPED | OUTPATIENT
Start: 2024-10-11

## 2024-10-11 NOTE — TELEPHONE ENCOUNTER
Rx Refill Note  Requested Prescriptions     Pending Prescriptions Disp Refills    hydrOXYzine (ATARAX) 10 MG tablet 30 tablet 0     Sig: Take 1 tablet by mouth 3 (Three) Times a Day As Needed for Anxiety.      Last office visit with prescribing clinician: 7/2/2024   Last telemedicine visit with prescribing clinician: Visit date not found   Next office visit with prescribing clinician: 12/3/2024                         Would you like a call back once the refill request has been completed: [] Yes [] No    If the office needs to give you a call back, can they leave a voicemail: [] Yes [] No    Clifford Gil MA  10/11/24, 08:44 EDT

## 2024-10-22 RX ORDER — NORGESTIMATE AND ETHINYL ESTRADIOL 7DAYSX3 28
1 KIT ORAL DAILY
Qty: 84 TABLET | Refills: 0 | Status: SHIPPED | OUTPATIENT
Start: 2024-10-22

## 2024-10-24 ENCOUNTER — PATIENT MESSAGE (OUTPATIENT)
Dept: BARIATRICS/WEIGHT MGMT | Facility: CLINIC | Age: 24
End: 2024-10-24
Payer: COMMERCIAL

## 2024-10-30 RX ORDER — NORGESTIMATE AND ETHINYL ESTRADIOL 7DAYSX3 28
1 KIT ORAL DAILY
Qty: 84 TABLET | Refills: 0 | Status: CANCELLED | OUTPATIENT
Start: 2024-10-30

## 2024-10-30 NOTE — TELEPHONE ENCOUNTER
Rx Refill Note  Requested Prescriptions     Pending Prescriptions Disp Refills    hydrOXYzine (ATARAX) 10 MG tablet 30 tablet 0     Sig: Take 1 tablet by mouth 3 (Three) Times a Day As Needed for Anxiety.      Last office visit with prescribing clinician: 7/2/2024   Last telemedicine visit with prescribing clinician: Visit date not found   Next office visit with prescribing clinician: 12/3/2024                         Would you like a call back once the refill request has been completed: [] Yes [] No    If the office needs to give you a call back, can they leave a voicemail: [] Yes [] No    Susan Schmidt MA  10/30/24, 08:55 EDT

## 2024-10-31 RX ORDER — HYDROXYZINE HYDROCHLORIDE 10 MG/1
10 TABLET, FILM COATED ORAL 3 TIMES DAILY PRN
Qty: 30 TABLET | Refills: 0 | Status: SHIPPED | OUTPATIENT
Start: 2024-10-31

## 2024-11-21 ENCOUNTER — OFFICE VISIT (OUTPATIENT)
Dept: BARIATRICS/WEIGHT MGMT | Facility: CLINIC | Age: 24
End: 2024-11-21
Payer: COMMERCIAL

## 2024-11-21 VITALS
WEIGHT: 254 LBS | DIASTOLIC BLOOD PRESSURE: 86 MMHG | HEART RATE: 75 BPM | TEMPERATURE: 98.1 F | HEIGHT: 65 IN | BODY MASS INDEX: 42.32 KG/M2 | SYSTOLIC BLOOD PRESSURE: 136 MMHG

## 2024-11-21 DIAGNOSIS — E66.01 OBESITY, CLASS III, BMI 40-49.9 (MORBID OBESITY): Primary | ICD-10-CM

## 2024-11-21 DIAGNOSIS — Z71.3 WEIGHT LOSS COUNSELING, ENCOUNTER FOR: ICD-10-CM

## 2024-11-21 DIAGNOSIS — Z71.3 DIETARY COUNSELING: ICD-10-CM

## 2024-11-21 RX ORDER — SEMAGLUTIDE 0.25 MG/.5ML
0.25 INJECTION, SOLUTION SUBCUTANEOUS WEEKLY
Qty: 2 ML | Refills: 0 | Status: SHIPPED | OUTPATIENT
Start: 2024-11-21 | End: 2024-12-19

## 2024-11-21 NOTE — PATIENT INSTRUCTIONS
Recommended patient be sure to get at least 70 grams of protein per day by eating small, frequent meals all with high lean protein choices. Be sure to limit/cut back on daily carbohydrate intake. Discussed with the patient the recommended amount of water per day to intake. Be sure to do routine exercise including both cardio and strength training.

## 2024-11-21 NOTE — PROGRESS NOTES
[facility] BARIATRIC [facility] BARIATRIC SURGERY  [address]  [facility] [id number] [phone]  [address]  [facility] [address] [phone]  Dept: [phone]  11/21/2024      Amy Brown.  [id number]  [id number]  2000  female      Chief Complaint   Patient presents with   • Follow-up     [facility] Bariatric Medicine:   Amy Brown presents today for follow up today for provider supervised medical weight loss.    HPI:   Today's weight is 115 kg (254 lb) pounds, today's BMI is Body mass index is 41.71 kg/m²., she has a  gain of 3 pounds since the last visit.     Amy Brown reports she did well with 0.25mg wegovy and felt like she had appetite reduction and denies any AE. When she went to the 0.5mg she had trouble with nausea and constipation and feels like she didn't have as much appetite suppression. Says she is doing really well with lean protein and vegetables intake. Making great choices with her dietary intake and so frustrated with her weight loss. She is doing well with her exercise routinely.      Diet and Exercise: Diet history reviewed and discussed with the patient. Weight loss/gains to date discussed with the patient.     Review of Systems   Constitutional:  Positive for unexpected weight change.   All other systems reviewed and are negative.    Patient Active Problem List   Diagnosis   • Annual physical exam   • Depression with anxiety   • Acne vulgaris   • Obesity, Class III, BMI 40-49.9 (morbid obesity)   • Dietary counseling   • Weight loss counseling, encounter for   • GERD (gastroesophageal reflux disease)       Past Medical History:   Diagnosis Date   • Allergic    • Anemia    • Anxiety    • ASD (atrial septal defect)     closed on its own at 1 year of age   • Depression    • Diverticulosis 5/11    Diagnosed in ER   • Headache 3/2/2021   • Hiatal hernia    • History of medical problems     Colitis   •  Obesity        The following portions of the patient's history were reviewed and updated as appropriate: allergies, current medications, past family history, past medical history, past social history, past surgical history, and problem list.    Vitals:    11/21/24 0911   BP: 136/86   Pulse: 75   Temp: 98.1 °F (36.7 °C)       Physical Exam  Vitals reviewed.   Constitutional:       Appearance: Normal appearance. She is well-developed. She is obese.   HENT:      Head: Normocephalic and atraumatic.   Cardiovascular:      Rate and Rhythm: Normal rate.   Pulmonary:      Effort: Pulmonary effort is normal.   Abdominal:      Palpations: Abdomen is soft.   Musculoskeletal:         General: Normal range of motion.   Skin:     General: Skin is warm and dry.   Neurological:      Mental Status: She is alert and oriented to person, place, and time.   Psychiatric:         Behavior: Behavior normal.         Thought Content: Thought content normal.         Judgment: Judgment normal.     Assessment:   The patient is doing well with nutrition.   (E66.01) Obesity, Class III, BMI 40-49.9 (morbid obesity)    (Z71.3) Weight loss counseling, encounter for    (Z71.3) Dietary counseling     Plan:       Discussed switching to compound so she can stay on 0.25mg. Reviewed compound differences from commercial and she signed consent. Keep up the great work with her lifestyle modification.  Encouraged patient to be sure to get plenty of lean protein per day through small frequent meals all with a protein source.   Activity restrictions: none.   Recommended patient be sure to get at least 70 grams of protein per day by eating small, frequent meals all with high lean protein choices. Be sure to limit/cut back on daily carbohydrate intake. Discussed with the patient the recommended amount of water per day to intake- half of body weight in ounces. Reviewed vitamin requirements. Be sure to do routine exercise, 150 minutes per week minimum, including  both cardio and strength training.     Instructions / Recommendations: dietary counseling recommended, recommended a daily protein intake of  grams, vitamin supplement(s) recommended, recommended exercising at least 150 minutes per week, behavior modifications recommended and instructed to call the office for concerns, questions, or problems.     The patient was instructed to follow up in 2 months.     The patient was counseled regarding. Total time spent during this encounter today was 30 minutes

## 2024-12-03 ENCOUNTER — TELEPHONE (OUTPATIENT)
Dept: FAMILY MEDICINE CLINIC | Facility: CLINIC | Age: 24
End: 2024-12-03

## 2024-12-03 ENCOUNTER — OFFICE VISIT (OUTPATIENT)
Dept: FAMILY MEDICINE CLINIC | Facility: CLINIC | Age: 24
End: 2024-12-03
Payer: COMMERCIAL

## 2024-12-03 VITALS
OXYGEN SATURATION: 98 % | HEIGHT: 65 IN | SYSTOLIC BLOOD PRESSURE: 128 MMHG | HEART RATE: 77 BPM | DIASTOLIC BLOOD PRESSURE: 76 MMHG | BODY MASS INDEX: 42.03 KG/M2 | WEIGHT: 252.3 LBS

## 2024-12-03 DIAGNOSIS — Z79.899 HIGH RISK MEDICATION USE: ICD-10-CM

## 2024-12-03 DIAGNOSIS — F41.9 ANXIETY: Primary | ICD-10-CM

## 2024-12-03 DIAGNOSIS — R11.0 NAUSEA: ICD-10-CM

## 2024-12-03 DIAGNOSIS — R19.7 DIARRHEA, UNSPECIFIED TYPE: ICD-10-CM

## 2024-12-03 DIAGNOSIS — Z00.00 WELL ADULT EXAM: ICD-10-CM

## 2024-12-03 PROCEDURE — 99395 PREV VISIT EST AGE 18-39: CPT | Performed by: INTERNAL MEDICINE

## 2024-12-03 PROCEDURE — 93000 ELECTROCARDIOGRAM COMPLETE: CPT | Performed by: INTERNAL MEDICINE

## 2024-12-03 NOTE — TELEPHONE ENCOUNTER
Caller: Socorro Brown    Relationship to patient: Mother    Chief complaint: MOM CALLING STATING THAT THIS MORNING, THE PATIENT HAD PAIN UP HER BACK WITH CHEST PRESSURE, CHEST SQUEEZING, HEART RATE  AND SHE LOST THE COLOR IN HER FACE.       Patient directed to call 911 or go to their nearest emergency room.     Patient verbalized understanding: [x] Yes  [] No  If no, why?    Additional notes:PATIENT'S MOM STATES SHE WILL BE GOING TO [facility] EMERGENCY ROOM.        MOM DID NOT WANT TO CANCEL TODAY'S APPT FOR A PHYSICAL.   SHE STATES SHE WANTS TO SEE IF THE PATIENT WILL BE EVALUATED AS HAVING HAD AN ANXIETY ATTACK THIS MORNING, AND IF SO, SHE WANTS THE PATIENT TO KEEP THE PHYSICAL FOR TODAY AT 1:00.

## 2024-12-03 NOTE — TELEPHONE ENCOUNTER
Caller: Socorro Brown    Relationship to patient: Mother    Best call back number: [phone]    Chief complaint: PATIENTS MOTHER CALLED STATING THAT SHE WAS HAVING TO GO PICK THE PATIENT UP FROM WORK, SHE WAS HAVING CHEST PAIN AND HER HEART RATE WAS ELEVATED . ADVISED THAT SHE WOULD NEED TO TAKE HER TO THE ER, SHE AGREED AND SAID SHE WOULD.     Patient directed to call 911 or go to their nearest emergency room.     Patient verbalized understanding: [x] Yes  [] No  If no, why?    Additional notes:

## 2024-12-03 NOTE — PROGRESS NOTES
Chief Complaint  Annual Exam    Patient or patient representative verbalized consent for the use of Ambient Listening during the visit with  Brenda Patel MD for chart documentation. 12/3/2024  13:23 EST    Subjective        Amy Brown presents to [facility]    History of Present Illness  The patient presents for a physical exam.    She experienced a panic attack this morning, which led her to visit the ER. She reported a racing heart rate of 112, chest tightness, and a sensation of heat. She attempted to calm herself through deep breathing, but this was unsuccessful. The symptoms subsided temporarily but returned with increased intensity.    She also reported a shooting pain in her lower back that extended upwards. Despite attempts to alleviate the discomfort through stretching and adjusting her chair, the pain persisted.  No cp just tightness.  No syncope nor presyncope.    She had consumed water and taken Mucinex DM and Tylenol Sinus for congestion and a sinus headache, respectively. She was informed that her symptoms could be a reaction to the medication. She has been using Flonase at night and took Mucinex DM and Tylenol for a headache this morning.    She is currently on her menstrual cycle. She has been experiencing daily stomach pain, diarrhea, and nausea for years, which she believes may be due to celiac disease. She has been trying to reduce her carbohydrate intake and has been using an elliptical machine daily. She does not consume milk, alcohol, or sodas, and drinks sugar-free coffee. She has stopped consuming energy drinks.    She is currently under the care of Dr. Anay Henry, a bariatric doctor, and is taking semaglutide. She experienced stomach issues with Wegovy at the higher dosage.  So far she has tolerated the compounded semaglutide w/o side effects.   Pap smear 2023 and negative.  She is on her cycle now.    She still has her gallbladder and  "appendix.    FAMILY HISTORY  She has a family history of high blood sugar, cholesterol, and diabetes.      Objective   Vital Signs:  /76   Pulse 77   Ht 166.2 cm (65.43\")   Wt 114 kg (252 lb 4.8 oz)   SpO2 98%   BMI 41.43 kg/m²   Estimated body mass index is 41.43 kg/m² as calculated from the following:    Height as of this encounter: 166.2 cm (65.43\").    Weight as of this encounter: 114 kg (252 lb 4.8 oz).            Physical Exam  Vitals and nursing note reviewed.   Constitutional:       Appearance: Normal appearance. She is well-developed.   HENT:      Head: Normocephalic and atraumatic.      Right Ear: Tympanic membrane, ear canal and external ear normal.      Left Ear: Tympanic membrane, ear canal and external ear normal.      Mouth/Throat:      Mouth: Mucous membranes are moist.   Eyes:      Extraocular Movements: Extraocular movements intact.      Conjunctiva/sclera: Conjunctivae normal.   Neck:      Vascular: No carotid bruit.   Cardiovascular:      Rate and Rhythm: Normal rate and regular rhythm.      Heart sounds: Normal heart sounds.      Comments: No bruits  Pulmonary:      Effort: Pulmonary effort is normal. No respiratory distress.      Breath sounds: Normal breath sounds. No stridor. No wheezing, rhonchi or rales.   Chest:      Chest wall: No tenderness.   Abdominal:      General: Bowel sounds are normal. There is no distension.      Palpations: Abdomen is soft. There is no mass.      Tenderness: There is no abdominal tenderness. There is no guarding or rebound.      Hernia: No hernia is present.   Musculoskeletal:      Cervical back: Neck supple.      Right lower leg: No edema.      Left lower leg: No edema.   Lymphadenopathy:      Cervical: No cervical adenopathy.   Skin:     General: Skin is warm.   Neurological:      General: No focal deficit present.      Mental Status: She is alert and oriented to person, place, and time. Mental status is at baseline.   Psychiatric:         Mood and " Affect: Mood normal.         Behavior: Behavior normal.         Thought Content: Thought content normal.         Judgment: Judgment normal.        Result Review :                   Assessment and Plan   Diagnoses and all orders for this visit:    1. Anxiety (Primary)  -     ECG 12 Lead    2. High risk medication use    3. Well adult exam  -     CBC & Differential  -     Comprehensive Metabolic Panel  -     T4, Free  -     TSH  -     Lipid Panel With LDL / HDL Ratio  -     Celiac Disease Panel  -     ECG 12 Lead    4. Diarrhea, unspecified type  -     Celiac Disease Panel    5. Nausea  -     Celiac Disease Panel             Assessment & Plan  1. Panic attack.  She experienced a panic attack today with symptoms including back pain, chest tightness, and a racing heart rate of 112 bpm. She was advised to avoid Tylenol Sinus as it can cause heart rate and blood pressure fluctuations. Instead, she can use Tylenol or Advil for pressure relief. For sinus issues, Flonase or Nasonex can be used. Mucinex DM is acceptable, but Mucinex D should be avoided. An EKG was performed and results were normal. Blood work will be ordered today.    2. Weight management.  She is currently working with Dr. Anay Henry, a bariatric specialist, and is on semaglutide. She has taken one dose and will take the second dose tonight. She was advised to monitor her diet, especially on the day of and the day after her dose, and to avoid high-fat foods to prevent gastrointestinal discomfort. She was also advised to continue using the elliptical and watching her carbohydrate intake.    3. Suspected celiac disease.  She reports frequent diarrhea and nausea, and her mother suspects she may have celiac disease. A lab test for celiac disease will be ordered.  She had a colonoscopy earlier this year in September.  I wonder if semaglutide may slow down her GI transit and help her diarrhea symptoms.  We will have to watch for worsening nausea however.  If  celiac panel is negative, will refer to GI    4. Health Maintenance.  A Pap smear will be scheduled for 6 months from now.           Follow Up   Return in about 1 year (around 12/3/2025).  Patient was given instructions and counseling regarding her condition or for health maintenance advice. Please see specific information pulled into the AVS if appropriate.           Brenda Patel MD   14:18 EST   [unfilled]

## 2024-12-03 NOTE — PROGRESS NOTES
Procedure     ECG 12 Lead    Date/Time: 12/3/2024 2:15 PM  Performed by: Brenda Patel MD    Authorized by: Brenda Patel MD  Previous ECG: no previous ECG available  Rhythm: sinus rhythm  Rate: normal  Conduction: conduction normal  ST Segments: ST segments normal  T Waves: T waves normal  QRS axis: normal    Clinical impression: normal ECG

## 2024-12-03 NOTE — TELEPHONE ENCOUNTER
Called and spoke to pt mom she went to the ER and vitals was checked determined probably from sinus med or anxiety. Pt scheduled today at 1:00 pm 12/3.

## 2024-12-04 LAB
ALBUMIN SERPL-MCNC: 4.3 G/DL (ref 4–5)
ALP SERPL-CCNC: 79 IU/L (ref 44–121)
ALT SERPL-CCNC: 14 IU/L (ref 0–32)
AST SERPL-CCNC: 15 IU/L (ref 0–40)
BASOPHILS # BLD AUTO: 0 X10E3/UL (ref 0–0.2)
BASOPHILS NFR BLD AUTO: 0 %
BILIRUB SERPL-MCNC: 0.4 MG/DL (ref 0–1.2)
BUN SERPL-MCNC: 14 MG/DL (ref 6–20)
BUN/CREAT SERPL: 19 (ref 9–23)
CALCIUM SERPL-MCNC: 9.5 MG/DL (ref 8.7–10.2)
CHLORIDE SERPL-SCNC: 103 MMOL/L (ref 96–106)
CHOLEST SERPL-MCNC: 192 MG/DL (ref 100–199)
CO2 SERPL-SCNC: 22 MMOL/L (ref 20–29)
CREAT SERPL-MCNC: 0.73 MG/DL (ref 0.57–1)
EGFRCR SERPLBLD CKD-EPI 2021: 116 ML/MIN/1.73
ENDOMYSIUM IGA SER QL: NEGATIVE
EOSINOPHIL # BLD AUTO: 0 X10E3/UL (ref 0–0.4)
EOSINOPHIL NFR BLD AUTO: 0 %
ERYTHROCYTE [DISTWIDTH] IN BLOOD BY AUTOMATED COUNT: 12.4 % (ref 11.7–15.4)
GLOBULIN SER CALC-MCNC: 2.9 G/DL (ref 1.5–4.5)
GLUCOSE SERPL-MCNC: 89 MG/DL (ref 70–99)
HCT VFR BLD AUTO: 41.8 % (ref 34–46.6)
HDLC SERPL-MCNC: 75 MG/DL
HGB BLD-MCNC: 13.8 G/DL (ref 11.1–15.9)
IGA SERPL-MCNC: 311 MG/DL (ref 87–352)
IMM GRANULOCYTES # BLD AUTO: 0 X10E3/UL (ref 0–0.1)
IMM GRANULOCYTES NFR BLD AUTO: 0 %
LDLC SERPL CALC-MCNC: 107 MG/DL (ref 0–99)
LDLC/HDLC SERPL: 1.4 RATIO (ref 0–3.2)
LYMPHOCYTES # BLD AUTO: 2 X10E3/UL (ref 0.7–3.1)
LYMPHOCYTES NFR BLD AUTO: 22 %
MCH RBC QN AUTO: 30.3 PG (ref 26.6–33)
MCHC RBC AUTO-ENTMCNC: 33 G/DL (ref 31.5–35.7)
MCV RBC AUTO: 92 FL (ref 79–97)
MONOCYTES # BLD AUTO: 0.4 X10E3/UL (ref 0.1–0.9)
MONOCYTES NFR BLD AUTO: 4 %
NEUTROPHILS # BLD AUTO: 6.7 X10E3/UL (ref 1.4–7)
NEUTROPHILS NFR BLD AUTO: 74 %
PLATELET # BLD AUTO: 398 X10E3/UL (ref 150–450)
POTASSIUM SERPL-SCNC: 4.4 MMOL/L (ref 3.5–5.2)
PROT SERPL-MCNC: 7.2 G/DL (ref 6–8.5)
RBC # BLD AUTO: 4.56 X10E6/UL (ref 3.77–5.28)
SODIUM SERPL-SCNC: 140 MMOL/L (ref 134–144)
T4 FREE SERPL-MCNC: 1.12 NG/DL (ref 0.82–1.77)
TRIGL SERPL-MCNC: 53 MG/DL (ref 0–149)
TSH SERPL DL<=0.005 MIU/L-ACNC: 1.36 UIU/ML (ref 0.45–4.5)
TTG IGA SER-ACNC: <2 U/ML (ref 0–3)
VLDLC SERPL CALC-MCNC: 10 MG/DL (ref 5–40)
WBC # BLD AUTO: 9.2 X10E3/UL (ref 3.4–10.8)

## 2024-12-09 ENCOUNTER — E-VISIT (OUTPATIENT)
Dept: FAMILY MEDICINE CLINIC | Facility: TELEHEALTH | Age: 24
End: 2024-12-09
Payer: COMMERCIAL

## 2024-12-09 PROCEDURE — FABRICHEALTHVISIT: Performed by: NURSE PRACTITIONER

## 2024-12-09 RX ORDER — BROMPHENIRAMINE MALEATE, PSEUDOEPHEDRINE HYDROCHLORIDE, AND DEXTROMETHORPHAN HYDROBROMIDE 2; 30; 10 MG/5ML; MG/5ML; MG/5ML
SYRUP ORAL
Start: 2024-12-09 | End: 2024-12-10

## 2024-12-09 RX ORDER — DOXYCYCLINE 100 MG/1
100 CAPSULE ORAL 2 TIMES DAILY
Start: 2024-12-09 | End: 2024-12-16

## 2024-12-09 NOTE — E-VISIT TREATED
Date: 2024 08:01:12  Clinician: Minnie Martin  Clinician NPI: [id number]  Patient: Amy Brown  Patient : 2000  Patient Address: [address] [place], [place] [address]  Patient Phone: [phone]  Visit Protocol: URI  Patient Summary:  Amy is a 24 year old ( : 2000 ) female who initiated a visit for cold, sinus infection, or influenza.     Amy states the symptoms started gradually 2-3 weeks ago. After the symptoms started, they improved and then got   worse again.   Symptom start date: 24   The symptoms consist of a headache, myalgia, nasal congestion, nausea, a cough, facial pain or pressure, a sore throat, malaise, ear pain, and rhinitis. Amy is experiencing difficulty breathing due to   nasal congestion but is not short of breath.   Symptom details     Nasal secretions: The color of the mucus is green and yellow.    Cough: Amy coughs a few times an hour and the cough is not more bothersome at night. Phlegm comes into the throat when coughing. Amy believes the cough is caused by post-nasal drip. The color of the phlegm is yellow and green.     Sore throat: Amy reports having moderate throat pain (4-6 on a 10 point pain scale), does not have exudate on the tonsils, and can swallow liquids without any difficulty. The lymph nodes in the neck are not enlarged. A rash has not appeared on the   skin since the sore throat started.     Facial pain or pressure: The facial pain or pressure feels worse when bending over or leaning forward.     Headache: The headache is moderate (4-6 on a 10 point pain scale).      Amy denies having anosmia and ageusia, diarrhea, teeth pain, wheezing, chills, enlarged lymph nodes, fever, and vomiting. Amy also denies taking antibiotic medication in the past month and having recent facial or sinus surgery in the past 60   days.   Precipitating events  Amy is not sure if they have been exposed to someone with strep throat. Amy has  not recently been exposed to someone with influenza. Amy has not been in close contact with any high risk individuals.    Amy has   not received the influenza vaccination.   Pertinent COVID-19 (Coronavirus) information  Since the symptoms started, Amy has not tested for COVID-19.   Amy has not had COVID-19 in the last 3 months.   Amy has not received a COVID-19 vaccine in   the past year.   Pertinent medical history     Amy had 1 sinus infection within the past year.   A provider has not told Amy to avoid NSAIDs.   Amy does not get yeast infections when an antibiotic is taken.   Amy does not have diabetes.   Amy denies having immunosuppressive conditions (e.g., chemotherapy, HIV, organ transplant, long-term use of steroids or other immunosuppressive medications, splenectomy). Amy does not have asthma.   Amy denies having chronic lung disease, cystic   fibrosis, hypertension, long-term disabilities, mental health conditions, sickle cell disease or thalassemia, stroke or other cardiovascular disease, substance use disorders, or tuberculosis (TB).  Amy does not smoke or use smokeless tobacco. Amy   does not vape or use other e-cigarette products.   Amy denies pregnancy and denies breastfeeding.   Additional information as reported by the patient (free text): I was taking mucinex and started to get better but am now feeling worse   Weight: 245   lbs (111.13 kg)    MEDICATIONS: hydroxyzine HCl oral, calcium carbonate oral, fluconazole oral, Tri-Estarylla oral, semaglutide subcutaneous, ALLERGIES: cefdinir, Cephalosporins  Clinician Response:  Dear Amy,  Based on the information provided, you have acute bacterial sinusitis, also known as a sinus infection. Sinus infections are caused by bacteria or a virus and symptoms are almost always identical. The difference between   the 2 types of infections is timing.  Sinus infections start as viral infections and symptoms improve on  their own in about 7 days. A bacterial infection may have developed if any of the following apply to you:     You have had 7 days of symptoms and are experiencing at least 2 of the following:       Fever    Facial pressure or headache    Green or yellow nasal mucus    Symptoms that improved and then got worse again       You have had symptoms for 10 or more days     Medication information  I am prescribing:       Doxycycline hyclate 100 mg oral tablet. Take 1 tablet by mouth 2 times per day for 7 days. There are no refills with this prescription.      Brompheniramine-pseudoeph-DM (Bromfed DM) 2-30-10 mg/5mL oral syrup. Take 10 milliliters by mouth every 4 hours as needed for cough. Do not take more than 60mL in 24 hours. There are no refills with this prescription.     Certain antibiotics, such as doxycycline, levofloxacin, and moxifloxacin, can cause your skin to be more sensitive to the sun. Exposure to the sun when taking these antibiotics may cause a skin rash, itching, redness in lighter skin tones,   discoloration in darker skin tones, or a severe sunburn. Be sure to avoid direct exposure to the sun, even for short periods of time, especially between 10 am and 3 pm if possible. Protective clothing from the sun and a broad-spectrum sunscreen is   recommended for all skin types when outdoors.   We recommend a water-resistant, tinted, mineral, broad-spectrum sunscreen, SPF 30 or higher, that contains iron oxide or titanium dioxide. Reapply every 2 hours. Sunscreens that also contain antioxidants   can reduce your risk of sun damage and will enhance the sunscreen's effects providing additional protection for your skin. For more information, please visit the following link:  Sunscreen education  Yeast infections can be a common side effect of   antibiotics. The most common symptom of a yeast infection is itchiness in and around the vagina. Other signs and symptoms include burning, redness of the vulva (the outer  part of the female genitals), or a thick, white vaginal discharge that looks like   cottage cheese and does not have a bad smell.  If you become pregnant during this course of treatment, stop taking the medication and contact your primary care provider.  Self care  Steps you can take to be as comfortable as possible:     Rest.    Drink plenty of fluids.    Take a warm shower to loosen congestion.    Use a cool-mist humidifier.    Use throat lozenges.    Suck on frozen items such as popsicles.    Drink hot tea with lemon and honey.    Gargle with warm salt water (1/4 teaspoon of salt per 8 ounce glass of water).    Take a spoonful of honey to reduce your cough.     When to seek care  Please be seen in a clinic or urgent care if any of the following occur:     New symptoms develop, or symptoms become worse    Symptoms do not start to improve after 3 days of treatment     Call 911 or go to the emergency room if any of the following occur:     Difficulty breathing    If you feel that your throat is closing off    Suddenly develop a rash    Unable to swallow fluids or are drooling     It is possible to have an allergic reaction to an antibiotic even if you have not had one in the past. If you notice a new rash, significant swelling, or difficulty breathing, stop taking this medication immediately and go to a clinic or urgent care.    For the latest updates on COVID-19 (Coronavirus), please visit the Centers for Disease Control and Prevention (CDC). Also, your state and local health department websites may provide additional guidance regarding testing and isolation recommendations for   your location.   Diagnosis: Acute bacterial sinusitis  Diagnosis ICD: J01.90    Follow up instructions: ATTENTION: If you have been prescribed medications, your prescriptions will not be sent until you choose your pharmacy.  To do so open the link within your notification, or go to [employer] and click eVisit in the menu to open your    treatment plan. From there, you can select your pharmacy at the bottom of your after visit summary. You can also go to [email or web address]  Prescriptions  Prescription: doxycycline hyclate 100 mg oral tablet, take 1 tablet by mouth 2 times per day for 7 days  Sent To: [facility]/pharmacy #[id number] - [id number] - [address] [place] [address]  Prescription: brompheniramine-pseudoeph-DM (Bromfed DM) 2-30-10 mg/5 mL oral syrup, take 10 milliliters by mouth every 4 hours as needed for cough  Sent To: [facility]/pharmacy #[id number] - [id number] - [address] [id number]

## 2024-12-10 RX ORDER — HYDROXYZINE HYDROCHLORIDE 10 MG/1
10 TABLET, FILM COATED ORAL 3 TIMES DAILY PRN
Qty: 30 TABLET | Refills: 0 | Status: SHIPPED | OUTPATIENT
Start: 2024-12-10

## 2024-12-10 NOTE — TELEPHONE ENCOUNTER
Rx Refill Note  Requested Prescriptions     Pending Prescriptions Disp Refills    hydrOXYzine (ATARAX) 10 MG tablet 30 tablet 0     Sig: Take 1 tablet by mouth 3 (Three) Times a Day As Needed for Anxiety.      Last office visit with prescribing clinician: 12/3/2024   Last telemedicine visit with prescribing clinician: Visit date not found   Next office visit with prescribing clinician: 6/3/2025                         Would you like a call back once the refill request has been completed: [] Yes [] No    If the office needs to give you a call back, can they leave a voicemail: [] Yes [] No    Clifford Gil MA  12/10/24, 08:08 EST

## 2024-12-11 ENCOUNTER — TELEMEDICINE (OUTPATIENT)
Dept: FAMILY MEDICINE CLINIC | Facility: TELEHEALTH | Age: 24
End: 2024-12-11
Payer: COMMERCIAL

## 2024-12-11 DIAGNOSIS — T88.7XXA MEDICATION SIDE EFFECTS: ICD-10-CM

## 2024-12-11 DIAGNOSIS — R11.2 NAUSEA AND VOMITING, UNSPECIFIED VOMITING TYPE: Primary | ICD-10-CM

## 2024-12-11 DIAGNOSIS — J01.90 ACUTE NON-RECURRENT SINUSITIS, UNSPECIFIED LOCATION: ICD-10-CM

## 2024-12-11 DIAGNOSIS — R51.9 ACUTE NONINTRACTABLE HEADACHE, UNSPECIFIED HEADACHE TYPE: ICD-10-CM

## 2024-12-11 RX ORDER — AZITHROMYCIN 250 MG/1
TABLET, FILM COATED ORAL
Qty: 6 TABLET | Refills: 0 | Status: SHIPPED | OUTPATIENT
Start: 2024-12-11 | End: 2024-12-16

## 2024-12-11 RX ORDER — ONDANSETRON 8 MG/1
8 TABLET, ORALLY DISINTEGRATING ORAL EVERY 8 HOURS PRN
Qty: 15 TABLET | Refills: 0 | Status: SHIPPED | OUTPATIENT
Start: 2024-12-11 | End: 2024-12-16

## 2024-12-11 NOTE — LETTER
December 11, 2024     Patient: Amy Brown   YOB: 2000   Date of Visit: 12/11/2024       To Whom It May Concern:    It is my medical opinion that Amy Brown may return to work Friday 12/13/2024.           Sincerely,    GAGE Ramirez    CC: No Recipients

## 2024-12-11 NOTE — PROGRESS NOTES
Subjective   Chief Complaint   Patient presents with    Nausea    Headache    Sinus Problem       Amy Brown is a 24 y.o. female.     History of Present Illness  Patient has been dealing with sinus and URI symptoms for the past 3 weeks.  She was treated for sinusitis 2 days ago via e-visit with doxycycline.  She states after starting the doxycycline she developed nausea, vomiting and headache.  Nausea is worse after eating.  Her last dose of doxycycline was yesterday but she doesn't feel much better. Sinus symptoms have improved.   Nausea  Symptoms are new.   Episode onset: 2 days.   Symptoms have been unchanged since onset.   Symptoms include congestion, cough, fatigue, headaches, nausea and vomiting.    Pertinent negative symptoms include no chills, no diaphoresis, no fever and no sore throat.   Headache  Pain quality: pressure.  Location:  Back of head and front/forehead  Sinus Problem  This is a new problem. Episode onset: 3 weeks. The problem has been waxing and waning since onset. There has been no fever. Associated symptoms include congestion, coughing, headaches and sinus pressure. Pertinent negatives include no chills, diaphoresis, ear pain, shortness of breath or sore throat.        Allergies   Allergen Reactions    Cephalosporins Hives and Itching       Past Medical History:   Diagnosis Date    Allergic     Anemia     Anxiety     ASD (atrial septal defect)     closed on its own at 1 year of age    Depression     Diverticulosis 5/11    Diagnosed in ER    Headache 3/2/2021    Hiatal hernia     History of medical problems     Colitis    Obesity        Past Surgical History:   Procedure Laterality Date    COLONOSCOPY  2024    ENDOSCOPY  2007       Social History     Socioeconomic History    Marital status: Single    Number of children: 0   Tobacco Use    Smoking status: Never     Passive exposure: Never    Smokeless tobacco: Never   Vaping Use    Vaping status: Never Used   Substance and Sexual Activity     Alcohol use: Yes     Alcohol/week: 1.0 standard drink of alcohol     Types: 1 Drinks containing 0.5 oz of alcohol per week     Comment: occ    Drug use: Never    Sexual activity: Defer     Partners: Male     Birth control/protection: Condom, Birth control pill, OCP       Family History   Problem Relation Age of Onset    Anxiety disorder Mother     Depression Mother     Mental illness Mother     Thyroid disease Mother     Hyperlipidemia Father     Cancer Father         Skin cancer    Hypertension Father     Sleep apnea Father     Hypertension Maternal Grandmother     Arthritis Maternal Grandmother     Hypertension Maternal Grandfather     Alcohol abuse Maternal Grandfather     Cancer Paternal Grandmother         Colon and rectal cancer    Diabetes Paternal Grandmother     COPD Paternal Grandfather          Current Outpatient Medications:     Azelastine HCl 137 MCG/SPRAY solution, SPRAY 2 SPRAYS INTO INTO EACH NOSTRIL TWICE A DAY AS DIRECTED BY PROVIDER, Disp: 30 mL, Rfl: 3    azithromycin (Zithromax Z-Seferino) 250 MG tablet, Take 2 tablets by mouth on day 1, then 1 tablet daily on days 2-5, Disp: 6 tablet, Rfl: 0    calcium carbonate (TUMS) 500 MG chewable tablet, Chew 1 tablet As Needed for Indigestion or Heartburn., Disp: , Rfl:     doxycycline (VIBRAMYCIN) 100 MG capsule, Take 1 capsule by mouth 2 (Two) Times a Day., Disp: , Rfl:     hydrOXYzine (ATARAX) 10 MG tablet, Take 1 tablet by mouth 3 (Three) Times a Day As Needed for Anxiety., Disp: 30 tablet, Rfl: 0    Loratadine (Claritin) 10 MG capsule, Take 1 capsule by mouth Daily., Disp: , Rfl:     ondansetron ODT (ZOFRAN-ODT) 8 MG disintegrating tablet, Place 1 tablet on the tongue Every 8 (Eight) Hours As Needed for Nausea or Vomiting., Disp: 15 tablet, Rfl: 0    Semaglutide,0.25 or 0.5MG/DOS, (OZEMPIC) 2 MG/1.5ML solution pen-injector, Inject 0.25 mg under the skin into the appropriate area as directed 1 (One) Time Per Week., Disp: , Rfl:     Tri-Estarylla  0.18/0.215/0.25 MG-35 MCG per tablet, TAKE 1 TABLET BY MOUTH EVERY DAY, Disp: 84 tablet, Rfl: 0      Review of Systems   Constitutional:  Positive for fatigue. Negative for chills, diaphoresis and fever.   HENT:  Positive for congestion and sinus pressure. Negative for ear pain and sore throat.    Respiratory:  Positive for cough. Negative for shortness of breath.    Gastrointestinal:  Positive for nausea and vomiting. Negative for diarrhea.   Neurological:  Positive for headache.        There were no vitals filed for this visit.    Objective   Physical Exam  Constitutional:       General: She is not in acute distress.     Appearance: Normal appearance. She is not ill-appearing, toxic-appearing or diaphoretic.   HENT:      Head: Normocephalic.      Nose: Congestion present.      Right Sinus: No maxillary sinus tenderness or frontal sinus tenderness.      Left Sinus: No maxillary sinus tenderness or frontal sinus tenderness.      Mouth/Throat:      Lips: Pink.      Mouth: Mucous membranes are moist.   Pulmonary:      Effort: Pulmonary effort is normal.   Neurological:      Mental Status: She is alert and oriented to person, place, and time.          Procedures     Assessment & Plan   Diagnoses and all orders for this visit:    1. Nausea and vomiting, unspecified vomiting type (Primary)  -     ondansetron ODT (ZOFRAN-ODT) 8 MG disintegrating tablet; Place 1 tablet on the tongue Every 8 (Eight) Hours As Needed for Nausea or Vomiting.  Dispense: 15 tablet; Refill: 0    2. Acute nonintractable headache, unspecified headache type    3. Acute non-recurrent sinusitis, unspecified location    4. Medication side effects    Increase fluids.  Continue treating symptoms.    If no improvement in the next 24 to 48 hours, recommend urgent care or ER visit.        PLAN: advised pt that the half life of doxy is 18-22hr, give another dayfor symptoms to improve. If no improvement follow up in person for further evaluation.  Patient is  agreeable to plan.  Discussed dosing, side effects, recommended other symptomatic care.  Patient should follow up with primary care provider, Urgent Care or ER if symptoms worsen, fail to resolve or other symptoms need attention. Patient/family agree to the above.         GAGE Ramirez     Mode of Visit: Video  Location of patient: -HOME-  Location of provider: +HOME+  You have chosen to receive care through a telehealth visit.  The patient has signed the video visit consent form.  The visit included audio and video interaction. No technical issues occurred during this visit.    The use of a video visit has been reviewed with the patient and verbal informed consent has been obtained. Myself and Amy Brown participated in this visit. The patient is located at [address] [place] [address]. I am located in [place], [place]. Mychart and Zoom were utilized.        This visit was performed via Telehealth.  This patient has been instructed to follow-up with their primary care provider if their symptoms worsen or the treatment provided does not resolve their illness.

## 2024-12-11 NOTE — PATIENT INSTRUCTIONS
Increase fluids.  Continue treating symptoms.    If no improvement in the next 24 to 48 hours, recommend urgent care or ER visit.

## 2024-12-16 ENCOUNTER — OFFICE VISIT (OUTPATIENT)
Dept: FAMILY MEDICINE CLINIC | Facility: CLINIC | Age: 24
End: 2024-12-16
Payer: COMMERCIAL

## 2024-12-16 VITALS
BODY MASS INDEX: 41.32 KG/M2 | OXYGEN SATURATION: 98 % | DIASTOLIC BLOOD PRESSURE: 86 MMHG | HEART RATE: 75 BPM | SYSTOLIC BLOOD PRESSURE: 124 MMHG | TEMPERATURE: 97.3 F | HEIGHT: 65 IN | WEIGHT: 248 LBS

## 2024-12-16 DIAGNOSIS — R11.2 NAUSEA AND VOMITING, UNSPECIFIED VOMITING TYPE: Primary | ICD-10-CM

## 2024-12-16 DIAGNOSIS — K29.00 ACUTE GASTRITIS, PRESENCE OF BLEEDING UNSPECIFIED, UNSPECIFIED GASTRITIS TYPE: ICD-10-CM

## 2024-12-16 LAB
B-HCG UR QL: NEGATIVE
EXPIRATION DATE: NORMAL
INTERNAL NEGATIVE CONTROL: NORMAL
INTERNAL POSITIVE CONTROL: NORMAL
Lab: NORMAL

## 2024-12-16 PROCEDURE — 99213 OFFICE O/P EST LOW 20 MIN: CPT | Performed by: FAMILY MEDICINE

## 2024-12-16 PROCEDURE — 81025 URINE PREGNANCY TEST: CPT | Performed by: FAMILY MEDICINE

## 2024-12-19 LAB — C DIFF TOX GENS STL QL NAA+PROBE: NEGATIVE

## 2024-12-26 ENCOUNTER — TELEPHONE (OUTPATIENT)
Dept: FAMILY MEDICINE CLINIC | Facility: CLINIC | Age: 24
End: 2024-12-26

## 2024-12-26 NOTE — TELEPHONE ENCOUNTER
Hub staff attempted to follow warm transfer process and was unsuccessful     Caller: Amy Brown    Relationship to patient: Self    Best call back number: [phone]     Patient is needing: PATIENT CALLED RETURNING A MISSED CALL FROM [facility], SHE IS REQUESTING A CALLBACK.

## 2025-01-15 ENCOUNTER — OFFICE VISIT (OUTPATIENT)
Dept: BARIATRICS/WEIGHT MGMT | Facility: CLINIC | Age: 25
End: 2025-01-15
Payer: COMMERCIAL

## 2025-01-15 VITALS
HEIGHT: 65 IN | SYSTOLIC BLOOD PRESSURE: 134 MMHG | DIASTOLIC BLOOD PRESSURE: 82 MMHG | TEMPERATURE: 97.8 F | WEIGHT: 255 LBS | HEART RATE: 73 BPM | BODY MASS INDEX: 42.49 KG/M2

## 2025-01-15 DIAGNOSIS — Z71.3 DIETARY COUNSELING: ICD-10-CM

## 2025-01-15 DIAGNOSIS — Z71.3 WEIGHT LOSS COUNSELING, ENCOUNTER FOR: ICD-10-CM

## 2025-01-15 DIAGNOSIS — E66.01 OBESITY, CLASS III, BMI 40-49.9 (MORBID OBESITY): Primary | ICD-10-CM

## 2025-01-15 PROBLEM — Z00.00 ANNUAL PHYSICAL EXAM: Status: RESOLVED | Noted: 2017-03-01 | Resolved: 2025-01-15

## 2025-01-15 RX ORDER — PHENTERMINE AND TOPIRAMATE 3.75; 23 MG/1; MG/1
1 CAPSULE, EXTENDED RELEASE ORAL EVERY MORNING
Qty: 14 CAPSULE | Refills: 0 | Status: SHIPPED | OUTPATIENT
Start: 2025-01-15 | End: 2025-01-29

## 2025-01-15 RX ORDER — TOPIRAMATE 50 MG/1
50 TABLET, FILM COATED ORAL DAILY
Qty: 30 TABLET | Refills: 0 | Status: SHIPPED | OUTPATIENT
Start: 2025-01-15

## 2025-01-15 NOTE — PROGRESS NOTES
[facility]ATR[facility] BARIATRIC SURGERY  [address]  [facility] [id number] [phone]  [address]  [facility] [id number] [phone]  Dept: [phone]  1/15/2025      Amy Brown.  [id number]  [id number]  2000  female      Chief Complaint   Patient presents with   • Follow-up     Follow [facility] Post-Op Bariatric Medicine:   Amy Brown presents today for follow up today for provider supervised medical weight loss.    HPI:   Today's weight is 116 kg (255 lb) pounds, today's BMI is Body mass index is 41.87 kg/m²., she has a  gain of 1 pounds since the last visit.     Amy Brown denjacquelin reports she isn't sure she tolerates GLP-1 therapy. After starting the compound 0.25mg she had to be placed on antibiotic therapy and then was very nauseous and not able to keep things down. Her PCP recommended she stop glp1 and she has been off for about 3 weeks. Her symptoms have totally resolved at this point. She would like to discuss other pharmacologic options. Patient not really interested in surgery at this time.  Was doing carnivore diet but wasn't sustainable. Her work started paying for [employer] so she started that last week.     Diet and Exercise: Diet history reviewed and discussed with the patient. Weight loss/gains to date discussed with the patient. The patient states they are eating 70 grams of protein per day. She reports eating 2+ meals per day, a typical portion size of 1+ cup, eating 1 snacks per day, drinking 5 or more 8-oz. glasses of water per day, no carbonated beverage consumption and exercising regularly.     Review of Systems   Constitutional:  Positive for appetite change.   All other systems reviewed and are negative.    Patient Active Problem List   Diagnosis   • Depression with anxiety   • Acne vulgaris   • Obesity, Class III, BMI 40-49.9 (morbid obesity)   • Dietary counseling   • Weight loss counseling, encounter for    • GERD (gastroesophageal reflux disease)       Past Medical History:   Diagnosis Date   • Allergic    • Anemia    • Anxiety    • ASD (atrial septal defect)     closed on its own at 1 year of age   • Depression    • Diverticulosis 5/11    Diagnosed in ER   • Headache 3/2/2021   • Hiatal hernia    • History of medical problems     Colitis   • Obesity        The following portions of the patient's history were reviewed and updated as appropriate: allergies, current medications, past family history, past medical history, past social history, past surgical history, and problem list.    Vitals:    01/15/25 1028   BP: 134/82   Pulse: 73   Temp: 97.8 °F (36.6 °C)       Physical Exam  Vitals reviewed.   Constitutional:       Appearance: Normal appearance. She is well-developed. She is obese.   HENT:      Head: Normocephalic and atraumatic.   Cardiovascular:      Rate and Rhythm: Normal rate.   Pulmonary:      Effort: Pulmonary effort is normal.   Abdominal:      Palpations: Abdomen is soft.   Musculoskeletal:         General: Normal range of motion.   Skin:     General: Skin is warm and dry.   Neurological:      Mental Status: She is alert and oriented to person, place, and time.   Psychiatric:         Behavior: Behavior normal.         Thought Content: Thought content normal.         Judgment: Judgment normal.     Assessment:     (E66.01) Obesity, Class III, BMI 40-49.9 (morbid obesity) - Plan: Phentermine-Topiramate (Qsymia) 3.75-23 MG capsule sustained-release 24 hr, Phentermine HCl 8 MG tablet    (Z71.3) Weight loss counseling, encounter for    (Z71.3) Dietary counseling     Plan:     Discussed oral pharmacologic options and patient would like to try qsymia. Rx'd medication start dose and titration dose. Reviewed medication- indication, contraindication, dosing, titration, administration and adverse effects. Gave her handouts regarding and answered all questions.  Encouraged patient to be sure to get plenty of lean  protein per day through small frequent meals all with a protein source.   Activity restrictions: none.   Recommended patient be sure to get at least 70 grams of protein per day by eating small, frequent meals all with high lean protein choices. Be sure to limit/cut back on daily carbohydrate intake. Discussed with the patient the recommended amount of water per day to intake- half of body weight in ounces. Reviewed vitamin requirements. Be sure to do routine exercise, 150 minutes per week minimum, including both cardio and strength training.     Instructions / Recommendations: dietary counseling recommended, recommended a daily protein intake of  grams, vitamin supplement(s) recommended, recommended exercising at least 150 minutes per week, behavior modifications recommended and instructed to call the office for concerns, questions, or problems.     The patient was instructed to follow up in 1 month.     The patient was counseled regarding. Total time spent during this encounter today was 30 minutes

## 2025-01-22 RX ORDER — NORGESTIMATE AND ETHINYL ESTRADIOL 7DAYSX3 28
1 KIT ORAL DAILY
Qty: 84 TABLET | Refills: 0 | Status: SHIPPED | OUTPATIENT
Start: 2025-01-22

## 2025-01-22 NOTE — TELEPHONE ENCOUNTER
Rx Refill Note  Requested Prescriptions     Pending Prescriptions Disp Refills    Tri-Estarylla 0.18/0.215/0.25 MG-35 MCG per tablet [Pharmacy Med Name: TRI-ESTARYLLA TABLET] 84 tablet 0     Sig: TAKE 1 TABLET BY MOUTH EVERY DAY      Last office visit with prescribing clinician: 12/3/2024   Last telemedicine visit with prescribing clinician: Visit date not found   Next office visit with prescribing clinician: 6/3/2025                         Would you like a call back once the refill request has been completed: [] Yes [] No    If the office needs to give you a call back, can they leave a voicemail: [] Yes [] No    Clifford Gil MA  01/22/25, 08:33 EST

## 2025-01-27 ENCOUNTER — TELEMEDICINE (OUTPATIENT)
Dept: FAMILY MEDICINE CLINIC | Facility: TELEHEALTH | Age: 25
End: 2025-01-27
Payer: COMMERCIAL

## 2025-01-27 ENCOUNTER — PATIENT MESSAGE (OUTPATIENT)
Dept: FAMILY MEDICINE CLINIC | Facility: TELEHEALTH | Age: 25
End: 2025-01-27

## 2025-01-27 DIAGNOSIS — J10.1 INFLUENZA A: Primary | ICD-10-CM

## 2025-01-27 DIAGNOSIS — J06.9 VIRAL UPPER RESPIRATORY TRACT INFECTION: Primary | ICD-10-CM

## 2025-01-27 PROCEDURE — 99213 OFFICE O/P EST LOW 20 MIN: CPT | Performed by: NURSE PRACTITIONER

## 2025-01-27 RX ORDER — OSELTAMIVIR PHOSPHATE 75 MG/1
75 CAPSULE ORAL 2 TIMES DAILY
Qty: 10 CAPSULE | Refills: 0 | Status: SHIPPED | OUTPATIENT
Start: 2025-01-27 | End: 2025-02-01

## 2025-01-27 RX ORDER — ONDANSETRON 4 MG/1
4 TABLET, ORALLY DISINTEGRATING ORAL EVERY 8 HOURS PRN
Qty: 10 TABLET | Refills: 0 | Status: SHIPPED | OUTPATIENT
Start: 2025-01-27

## 2025-01-27 NOTE — PROGRESS NOTES
"CHIEF COMPLAINT  Chief Complaint   Patient presents with    Fever    Generalized Body Aches    Cough    Earache    Headache         HPI  Amy Brown is a 24 y.o. female  presents with complaint of \"  Head ache, ear ache, runny nose, cough that hurts and is causing me to vomit where I can’t keep anything down. \"   Her boyfriend has similar symptoms and is going to get tested for flu/COVID later.   She has Dayquil and Nyquil at home, but too nauseated to take this. She needs medication for nausea.   She has not had the flu vaccine.     Review of Systems   Constitutional:  Positive for chills, diaphoresis, fatigue and fever (101-102).   HENT:  Positive for congestion, rhinorrhea, sneezing and sore throat. Negative for sinus pressure and sinus pain.    Respiratory:  Positive for cough (\"dry cough.\"). Negative for chest tightness, shortness of breath and wheezing.    Cardiovascular:  Negative for chest pain.   Gastrointestinal:  Positive for nausea and vomiting. Negative for abdominal distention, abdominal pain, anal bleeding, blood in stool, constipation, diarrhea and rectal pain.   Musculoskeletal:  Positive for myalgias.   Neurological:  Positive for headaches.       Past Medical History:   Diagnosis Date    Allergic     Anemia     Anxiety     ASD (atrial septal defect)     closed on its own at 1 year of age    Depression     Diverticulosis 5/11    Diagnosed in ER    Headache 3/2/2021    Hiatal hernia     History of medical problems     Colitis    Obesity        Family History   Problem Relation Age of Onset    Anxiety disorder Mother     Depression Mother     Mental illness Mother     Thyroid disease Mother     Hyperlipidemia Father     Cancer Father         Skin cancer    Hypertension Father     Sleep apnea Father     Hypertension Maternal Grandmother     Arthritis Maternal Grandmother     Hypertension Maternal Grandfather     Alcohol abuse Maternal Grandfather     Cancer Paternal Grandmother         Colon and " rectal cancer    Diabetes Paternal Grandmother     COPD Paternal Grandfather        Social History     Socioeconomic History    Marital status: Single    Number of children: 0   Tobacco Use    Smoking status: Never     Passive exposure: Never    Smokeless tobacco: Never   Vaping Use    Vaping status: Never Used   Substance and Sexual Activity    Alcohol use: Yes     Alcohol/week: 1.0 standard drink of alcohol     Types: 1 Drinks containing 0.5 oz of alcohol per week     Comment: occ    Drug use: Never    Sexual activity: Yes     Partners: Male     Birth control/protection: Birth control pill, OCP         LMP 01/13/2025 (Approximate)   Breastfeeding No     PHYSICAL EXAM  Physical Exam   Constitutional: She is oriented to person, place, and time. She appears well-developed and well-nourished. She has a sickly appearance. She does not appear ill. No distress.   HENT:   Head: Normocephalic and atraumatic.   Eyes: EOM are normal.   Neck: Neck normal appearance.  Pulmonary/Chest: Effort normal.  No respiratory distress.  Dry cough noted    Neurological: She is alert and oriented to person, place, and time.   Skin: Skin is dry.   Psychiatric: She has a normal mood and affect.           Diagnoses and all orders for this visit:    1. Viral upper respiratory tract infection (Primary)    Other orders  -     ondansetron ODT (ZOFRAN-ODT) 4 MG disintegrating tablet; Place 1 tablet on the tongue Every 8 (Eight) Hours As Needed for Nausea or Vomiting.  Dispense: 10 tablet; Refill: 0    If your boyfriend test positive for the flu and you would like to take the Tamiflu, please message me.     Mode of visit: Video   Myself and Amy Brown participated in this visit. The patient is located in [address] [place] [address]. I am located in [place], [place]. Mychart and Twilio were utilized.   You have chosen to receive care through a telehealth visit.     Does the patient consent to use a video/audio connection for your  medical care today? Yes       Note Disclaimer: At [facility], we believe that sharing information builds trust and better   relationships. You are receiving this note because you recently visited [facility]. It is possible you   will see health information before a provider has talked with you about it. This kind of information can   be easy to misunderstand. To help you fully understand what it means for your health, we urge you to   discuss this note with your provider.    GAGE Alvares  01/27/2025  10:58 EST

## 2025-01-27 NOTE — LETTER
January 27, 2025     Patient: Amy Brwon   YOB: 2000   Date of Visit: 1/27/2025       To Whom It May Concern:    It is my medical opinion that Amy Brown may return to work on Thursday 1/30/2025.     Sincerely,    GAGE Alvares     URGENT CARE VIDEO VISIT PROVIDER    CC: No Recipients

## 2025-01-27 NOTE — PATIENT INSTRUCTIONS
Drink plenty of water  Over the counter pain relievers okay   If symptoms do not improve in 3-5 days follow up with your primary care provider or urgent care  If symptoms worsen follow up with urgent care or the emergency room      Upper Respiratory Infection, Adult  An upper respiratory infection (URI) is a common viral infection of the nose, throat, and upper air passages that lead to the lungs. The most common type of URI is the common cold. URIs usually get better on their own, without medical treatment.  What are the causes?  A URI is caused by a virus. You may catch a virus by:  Breathing in droplets from an infected person's cough or sneeze.  Touching something that has been exposed to the virus (is contaminated) and then touching your mouth, nose, or eyes.  What increases the risk?  You are more likely to get a URI if:  You are very young or very old.  You have close contact with others, such as at work, school, or a health care facility.  You smoke.  You have long-term (chronic) heart or lung disease.  You have a weakened disease-fighting system (immune system).  You have nasal allergies or asthma.  You are experiencing a lot of stress.  You have poor nutrition.  What are the signs or symptoms?  A URI usually involves some of the following symptoms:  Runny or stuffy (congested) nose.  Cough.  Sneezing.  Sore throat.  Headache.  Fatigue.  Fever.  Loss of appetite.  Pain in your forehead, behind your eyes, and over your cheekbones (sinus pain).  Muscle aches.  Redness or irritation of the eyes.  Pressure in the ears or face.  How is this diagnosed?  This condition may be diagnosed based on your medical history and symptoms, and a physical exam. Your health care provider may use a swab to take a mucus sample from your nose (nasal swab). This sample can be tested to determine what virus is causing the illness.  How is this treated?  URIs usually get better on their own within 7-10 days. Medicines cannot cure  URIs, but your health care provider may recommend certain medicines to help relieve symptoms, such as:  Over-the-counter cold medicines.  Cough suppressants. Coughing is a type of defense against infection that helps to clear the respiratory system, so take these medicines only as recommended by your health care provider.  Fever-reducing medicines.  Follow these instructions at home:  Activity  Rest as needed.  If you have a fever, stay home from work or school until your fever is gone or until your health care provider says your URI cannot spread to other people (is no longer contagious). Your health care provider may have you wear a face mask to prevent your infection from spreading.  Relieving symptoms  Gargle with a mixture of salt and water 3-4 times a day or as needed. To make salt water, completely dissolve ½-1 tsp (3-6 g) of salt in 1 cup (237 mL) of warm water.  Use a cool-mist humidifier to add moisture to the air. This can help you breathe more easily.  Eating and drinking    Drink enough fluid to keep your urine pale yellow.  Eat soups and other clear broths.  General instructions    Take over-the-counter and prescription medicines only as told by your health care provider. These include cold medicines, fever reducers, and cough suppressants.  Do not use any products that contain nicotine or tobacco. These products include cigarettes, chewing tobacco, and vaping devices, such as e-cigarettes. If you need help quitting, ask your health care provider.  Stay away from secondhand smoke.  Stay up to date on all immunizations, including the yearly (annual) flu vaccine.  Keep all follow-up visits. This is important.  How to prevent the spread of infection to others  URIs can be contagious. To prevent the infection from spreading:  Wash your hands with soap and water for at least 20 seconds. If soap and water are not available, use hand .  Avoid touching your mouth, face, eyes, or nose.  Cough or sneeze  into a tissue or your sleeve or elbow instead of into your hand or into the air.    Contact a health care provider if:  You are getting worse instead of better.  You have a fever or chills.  Your mucus is brown or red.  You have yellow or brown discharge coming from your nose.  You have pain in your face, especially when you bend forward.  You have swollen neck glands.  You have pain while swallowing.  You have white areas in the back of your throat.  Get help right away if:  You have shortness of breath that gets worse.  You have severe or persistent:  Headache.  Ear pain.  Sinus pain.  Chest pain.  You have chronic lung disease along with any of the following:  Making high-pitched whistling sounds when you breathe, most often when you breathe out (wheezing).  Prolonged cough (more than 14 days).  Coughing up blood.  A change in your usual mucus.  You have a stiff neck.  You have changes in your:  Vision.  Hearing.  Thinking.  Mood.  These symptoms may be an emergency. Get help right away. Call 911.  Do not wait to see if the symptoms will go away.  Do not drive yourself to the hospital.  Summary  An upper respiratory infection (URI) is a common infection of the nose, throat, and upper air passages that lead to the lungs.  A URI is caused by a virus.  URIs usually get better on their own within 7-10 days.  Medicines cannot cure URIs, but your health care provider may recommend certain medicines to help relieve symptoms.  This information is not intended to replace advice given to you by your health care provider. Make sure you discuss any questions you have with your health care provider.  Document Revised: 07/20/2022 Document Reviewed: 07/20/2022  [employer] Patient Education © 2024 [employer]       Influenza, Adult  Influenza is also called the flu. It's an infection that affects your respiratory tract. This includes your nose, throat, windpipe, and lungs.  The flu is contagious. This means it spreads easily  from person to person. It causes symptoms that are like a cold. It can also cause a high fever and body aches.  What are the causes?  The flu is caused by the influenza virus. You can get it by:  Breathing in droplets that are in the air after an infected person coughs or sneezes.  Touching something that has the virus on it and then touching your mouth, nose, or eyes.  What increases the risk?  You may be more likely to get the flu if:  You don't wash your hands often.  You're near a lot of people during cold and flu season.  You touch your mouth, eyes, or nose without washing your hands first.  You don't get a flu shot each year.  You may also be more at risk for the flu and serious problems, such as a lung infection called pneumonia, if:  You're older than 65.  You're pregnant.  Your immune system is weak. Your immune system is your body's defense system.  You have a long-term, or chronic, condition, such as:  Heart, kidney, or lung disease.  Diabetes.  A liver disorder.  Asthma.  You're very overweight.  You have anemia. This is when you don't have enough red blood cells in your body.  What are the signs or symptoms?  Flu symptoms often start all of a sudden. They may last 4-14 days and include:  Fever and chills.  Headaches, body aches, or muscle aches.  Sore throat.  Cough.  Runny or stuffy nose.  Discomfort in your chest.  Not wanting to eat as much as normal.  Feeling weak or tired.  Feeling dizzy.  Nausea or vomiting.  How is this diagnosed?  The flu may be diagnosed based on your symptoms and medical history. You may also have a physical exam. A swab may be taken from your nose or throat and tested for the virus.  How is this treated?  If the flu is found early, you can be treated with antiviral medicine. This may be given to you by mouth or through an IV. It can help you feel less sick and get better faster.  Taking care of yourself at home can also help your symptoms get better. Your health care provider  may tell you to:  Take over-the-counter medicines.  Drink lots of fluids.  The flu often goes away on its own. If you have very bad symptoms or problems caused by the flu, you may need to be treated in a hospital.  Follow these instructions at home:  Activity  Rest as needed. Get lots of sleep.  Stay home from work or school as told by your provider.  Leave home only to go see your provider.  Do not leave home for other reasons until you don't have a fever for 24 hours without taking medicine.  Eating and drinking  Take an oral rehydration solution (ORS). This is a drink that is sold at pharmacies and stores.  Drink enough fluid to keep your pee pale yellow.  Try to drink small amounts of clear fluids. These include water, ice chips, fruit juice mixed with water, and low-calorie sports drinks.  Try to eat bland foods that are easy to digest. These include bananas, applesauce, rice, lean meats, toast, and crackers.  Avoid drinks that have a lot of sugar or caffeine in them. These include energy drinks, regular sports drinks, and soda.  Do not drink alcohol.  Do not eat spicy or fatty foods.  General instructions         Take your medicines only as told by your provider.  Use a cool mist humidifier to add moisture to the air in your home. This can make it easier for you to breathe. You should also clean the humidifier every day. To do so:  Empty the water.  Pour clean water in.  Cover your mouth and nose when you cough or sneeze.  Wash your hands with soap and water often and for at least 20 seconds. It's extra important to do so after you cough or sneeze. If you can't use soap and water, use hand .  How is this prevented?    Get a flu shot every year. Ask your provider when you should get your flu shot.  Stay away from people who are sick during fall and winter. Fall and winter are cold and flu season.  Contact a health care provider if:  You get new symptoms.  You have chest pain.  You have watery poop,  also called diarrhea.  You have a fever.  Your cough gets worse.  You start to have more mucus.  You feel like you may vomit, or you vomit.  Get help right away if:  You become short of breath or have trouble breathing.  Your skin or nails turn blue.  You have very bad pain or stiffness in your neck.  You get a sudden headache or pain in your face or ear.  You vomit each time you eat or drink.  These symptoms may be an emergency. Call 911 right away.  Do not wait to see if the symptoms will go away.  Do not drive yourself to the hospital.  This information is not intended to replace advice given to you by your health care provider. Make sure you discuss any questions you have with your health care provider.  Document Revised: 09/19/2024 Document Reviewed: 01/25/2024  [employer] Patient Education © 2024 [employer]

## 2025-01-29 DIAGNOSIS — J10.1 INFLUENZA A: Primary | ICD-10-CM

## 2025-01-29 RX ORDER — BENZONATATE 200 MG/1
200 CAPSULE ORAL 3 TIMES DAILY PRN
Qty: 21 CAPSULE | Refills: 0 | Status: SHIPPED | OUTPATIENT
Start: 2025-01-29

## 2025-01-29 RX ORDER — DEXTROMETHORPHAN HYDROBROMIDE AND PROMETHAZINE HYDROCHLORIDE 15; 6.25 MG/5ML; MG/5ML
5 SYRUP ORAL NIGHTLY PRN
Qty: 75 ML | Refills: 0 | Status: SHIPPED | OUTPATIENT
Start: 2025-01-29

## 2025-03-10 NOTE — TELEPHONE ENCOUNTER
Rx Refill Note  Requested Prescriptions     Pending Prescriptions Disp Refills    hydrOXYzine (ATARAX) 10 MG tablet 30 tablet 0     Sig: Take 1 tablet by mouth 3 (Three) Times a Day As Needed for Anxiety.      Last office visit with prescribing clinician: 12/3/2024   Last telemedicine visit with prescribing clinician: Visit date not found   Next office visit with prescribing clinician: 6/3/2025                         Would you like a call back once the refill request has been completed: [] Yes [] No    If the office needs to give you a call back, can they leave a voicemail: [] Yes [] No    Katheryn Coughlin MA  03/10/25, 09:20 EDT

## 2025-03-11 RX ORDER — HYDROXYZINE HYDROCHLORIDE 10 MG/1
10 TABLET, FILM COATED ORAL 3 TIMES DAILY PRN
Qty: 30 TABLET | Refills: 0 | Status: SHIPPED | OUTPATIENT
Start: 2025-03-11

## 2025-03-25 RX ORDER — TOPIRAMATE 50 MG/1
50 TABLET, FILM COATED ORAL DAILY
Qty: 30 TABLET | Refills: 0 | Status: SHIPPED | OUTPATIENT
Start: 2025-03-25

## 2025-05-01 ENCOUNTER — OFFICE VISIT (OUTPATIENT)
Dept: BARIATRICS/WEIGHT MGMT | Facility: CLINIC | Age: 25
End: 2025-05-01
Payer: COMMERCIAL

## 2025-05-01 VITALS
HEART RATE: 65 BPM | HEIGHT: 65 IN | DIASTOLIC BLOOD PRESSURE: 79 MMHG | WEIGHT: 253 LBS | TEMPERATURE: 97.6 F | BODY MASS INDEX: 42.15 KG/M2 | SYSTOLIC BLOOD PRESSURE: 126 MMHG

## 2025-05-01 DIAGNOSIS — Z71.3 DIETARY COUNSELING: ICD-10-CM

## 2025-05-01 DIAGNOSIS — E66.01 OBESITY, CLASS III, BMI 40-49.9 (MORBID OBESITY): Primary | ICD-10-CM

## 2025-05-01 DIAGNOSIS — Z71.3 WEIGHT LOSS COUNSELING, ENCOUNTER FOR: ICD-10-CM

## 2025-05-01 RX ORDER — TIMOLOL MALEATE 5 MG/ML
1 SOLUTION/ DROPS OPHTHALMIC DAILY
COMMUNITY
Start: 2025-03-19

## 2025-05-01 NOTE — PROGRESS NOTES
[facility] BARIATRIC SURGERY  [address]  [facility] [id number] [phone]  [address]  [facility] [address] [phone]  Dept: [phone]  5/1/2025      Amy Brown.  [id number]  [id number]  2000  female      Chief Complaint   Patient presents with   • Follow-up     Follow up L         Post-Op Bariatric Medicine:   Amy Brown presents today for follow up today for provider supervised medical weight loss.    HPI:   Today's weight is 115 kg (253 lb) pounds, today's BMI is Body mass index is 41.55 kg/m²., she has a  loss of 2 pounds since the last visit.     Amy Brown denies n/v/d/regurg and reports she does not notice much of a difference with the PO medications but just realized she has only been taking the phentermine. Thought it was odd that she had more effect with starter dose the first couple weeks. She just finished some school so working on drinking more water and less caffeine and has more time for routine exercise as well. Trying to do a routine and IF. Does eat out fairly often.     Diet and Exercise: Diet history reviewed and discussed with the patient. Weight loss/gains to date discussed with the patient. The patient states they are eating 70+ grams of protein per day. She reports eating 3 meals per day, a typical portion size of 1+ cup, eating 2 snacks per day, drinking 5+ or more 8-oz. glasses of water per day, no carbonated beverage consumption and exercising regularly- 3+ cardio and strength recently.     Review of Systems   Gastrointestinal:  Positive for constipation and diarrhea.   All other systems reviewed and are negative.    Patient Active Problem List   Diagnosis   • Depression with anxiety   • Acne vulgaris   • Obesity, Class III, BMI 40-49.9 (morbid obesity)   • Dietary counseling   • Weight loss counseling, encounter for   • GERD (gastroesophageal reflux disease)       Past Medical  History:   Diagnosis Date   • Allergic    • Anemia    • Anxiety    • ASD (atrial septal defect)     closed on its own at 1 year of age   • Depression    • Diverticulosis 5/11    Diagnosed in ER   • Headache 3/2/2021   • Hiatal hernia    • History of medical problems     Colitis   • Obesity        The following portions of the patient's history were reviewed and updated as appropriate: allergies, current medications, past family history, past medical history, past social history, past surgical history, and problem list.    Vitals:    05/01/25 1339   BP: 126/79   Pulse: 65   Temp: 97.6 °F (36.4 °C)       Physical Exam  Vitals reviewed.   Constitutional:       Appearance: Normal appearance. She is well-developed. She is obese.   HENT:      Head: Normocephalic and atraumatic.   Cardiovascular:      Rate and Rhythm: Normal rate.   Pulmonary:      Effort: Pulmonary effort is normal.   Abdominal:      Palpations: Abdomen is soft.   Musculoskeletal:         General: Normal range of motion.   Skin:     General: Skin is warm and dry.   Neurological:      Mental Status: She is alert and oriented to person, place, and time.   Psychiatric:         Behavior: Behavior normal.         Thought Content: Thought content normal.         Judgment: Judgment normal.     Assessment:     (E66.01) Obesity, Class III, BMI 40-49.9 (morbid obesity) - Plan: Phentermine HCl 8 MG tablet    (Z71.3) Weight loss counseling, encounter for    (Z71.3) Dietary counseling     Plan:     Sent low dose phentermine to combine with the topamax she has been taking. Recommended she be sure to maintain water intake and routine physical activity. Be mindful of daily lifestyle dietary choices and use WW toi if need accountability.   Encouraged patient to be sure to get plenty of lean protein per day through small frequent meals all with a protein source.   Activity restrictions: none.   Recommended patient be sure to get at least 70 grams of protein per day by  eating small, frequent meals all with high lean protein choices. Be sure to limit/cut back on daily carbohydrate intake. Discussed with the patient the recommended amount of water per day to intake- half of body weight in ounces. Reviewed vitamin requirements. Be sure to do routine exercise, 150 minutes per week minimum, including both cardio and strength training.     Instructions / Recommendations: dietary counseling recommended, recommended a daily protein intake of  grams, vitamin supplement(s) recommended, recommended exercising at least 150 minutes per week, behavior modifications recommended and instructed to call the office for concerns, questions, or problems.     The patient was instructed to follow up in 6 weeks.     The patient was counseled regarding. Total time spent during this encounter today was 30 minutes

## 2025-05-13 RX ORDER — ONDANSETRON 4 MG/1
4 TABLET, ORALLY DISINTEGRATING ORAL EVERY 8 HOURS PRN
Qty: 10 TABLET | Refills: 0 | Status: SHIPPED | OUTPATIENT
Start: 2025-05-13

## 2025-06-19 ENCOUNTER — E-VISIT (OUTPATIENT)
Dept: FAMILY MEDICINE CLINIC | Facility: TELEHEALTH | Age: 25
End: 2025-06-19

## 2025-06-19 ENCOUNTER — OFFICE VISIT (OUTPATIENT)
Dept: BARIATRICS/WEIGHT MGMT | Facility: CLINIC | Age: 25
End: 2025-06-19
Payer: COMMERCIAL

## 2025-06-19 VITALS
DIASTOLIC BLOOD PRESSURE: 88 MMHG | TEMPERATURE: 98.2 F | HEIGHT: 65 IN | HEART RATE: 76 BPM | WEIGHT: 253 LBS | BODY MASS INDEX: 42.15 KG/M2 | SYSTOLIC BLOOD PRESSURE: 132 MMHG

## 2025-06-19 DIAGNOSIS — Z71.3 WEIGHT LOSS COUNSELING, ENCOUNTER FOR: ICD-10-CM

## 2025-06-19 DIAGNOSIS — J01.40 ACUTE PANSINUSITIS, RECURRENCE NOT SPECIFIED: Primary | ICD-10-CM

## 2025-06-19 DIAGNOSIS — E66.01 OBESITY, CLASS III, BMI 40-49.9 (MORBID OBESITY): Primary | ICD-10-CM

## 2025-06-19 DIAGNOSIS — Z71.3 DIETARY COUNSELING: ICD-10-CM

## 2025-06-19 RX ORDER — AZITHROMYCIN 250 MG/1
TABLET, FILM COATED ORAL
Qty: 6 TABLET | Refills: 0 | Status: SHIPPED | OUTPATIENT
Start: 2025-06-19

## 2025-06-19 RX ORDER — ONDANSETRON 4 MG/1
4 TABLET, ORALLY DISINTEGRATING ORAL EVERY 8 HOURS PRN
Qty: 10 TABLET | Refills: 0 | Status: SHIPPED | OUTPATIENT
Start: 2025-06-19

## 2025-06-19 RX ORDER — TOPIRAMATE 50 MG/1
50 TABLET, FILM COATED ORAL DAILY
Qty: 30 TABLET | Refills: 0 | Status: SHIPPED | OUTPATIENT
Start: 2025-06-19

## 2025-06-19 NOTE — PROGRESS NOTES
[facility] BAR[facility]ATR[facility] BARIATRIC SURGERY  [address]  [facility] [id number] [phone]  [address]  [facility] [address] [phone]  Dept: [phone]  6/19/2025      Amy Brown.  [id number]  [id number]  2000  female      Chief Complaint   Patient presents with    Follow-up     Fup phentermine        Post-Op Bariatric Medicine:   Amy Brown presents today for follow up today for provider supervised medical weight loss.    HPI:   Today's weight is 115 kg (253 lb) pounds, today's BMI is Body mass index is 41.55 kg/m²., she has a  loss of 0 pounds since the last visit.     Amy Brown reports she currently has a sinus infection that she is about to start medications for. She said she hasn't had much of an appetite due to drainage and has had some nausea that the zofran helps. Feels like the phentermine/topamax combo helps some with her appetite but not as much as she was hoping. Does feel like her portion is reduced. Is doing IF 12-7pm.     Diet and Exercise: Diet history reviewed and discussed with the patient. Weight loss/gains to date discussed with the patient. The patient states they are eating 60-70 grams of protein per day. She reports eating 2 meals per day, a typical portion size of 1-1+ cup, eating 2 snacks per day, drinking 5 or more 8-oz. glasses of water per day, no carbonated beverage consumption and exercising regularly- cardio and strength training 3 plus days a week.     Review of Systems   Gastrointestinal:  Positive for nausea.   All other systems reviewed and are negative.      Patient Active Problem List   Diagnosis    Depression with anxiety    Acne vulgaris    Obesity, Class III, BMI 40-49.9 (morbid obesity)    Dietary counseling    Weight loss counseling, encounter for    GERD (gastroesophageal reflux disease)       Past Medical History:   Diagnosis Date    Allergic     Anemia     Anxiety     ASD (atrial  septal defect)     closed on its own at 1 year of age    Depression     Diverticulosis 5/11    Diagnosed in ER    Headache 3/2/2021    Hiatal hernia     History of medical problems     Colitis    Obesity        The following portions of the patient's history were reviewed and updated as appropriate: allergies, current medications, past family history, past medical history, past social history, past surgical history, and problem list.    Vitals:    06/19/25 1331   BP: 132/88   Pulse: 76   Temp: 98.2 °F (36.8 °C)       Physical Exam  Vitals reviewed.   Constitutional:       Appearance: Normal appearance. She is well-developed. She is obese.   HENT:      Head: Normocephalic and atraumatic.   Cardiovascular:      Rate and Rhythm: Normal rate.   Pulmonary:      Effort: Pulmonary effort is normal.   Abdominal:      Palpations: Abdomen is soft.   Musculoskeletal:         General: Normal range of motion.   Skin:     General: Skin is warm and dry.   Neurological:      Mental Status: She is alert and oriented to person, place, and time.   Psychiatric:         Behavior: Behavior normal.         Thought Content: Thought content normal.         Judgment: Judgment normal.         Assessment:     (E66.01) Obesity, Class III, BMI 40-49.9 (morbid obesity) - Plan: Phentermine HCl 8 MG tablet    (Z71.3) Weight loss counseling, encounter for    (Z71.3) Dietary counseling     Plan:     Will continue phentermine/topamax at current dosing and give it another month or so then re-evaluate. Continue with making healthy dietary choices including prioritizing protein, drinking plenty of water and routine exercise. Discussed BH for anxiety/stress management.  Encouraged patient to be sure to get plenty of lean protein per day through small frequent meals all with a protein source.   Activity restrictions: none.   Recommended patient be sure to get at least 70 grams of protein per day by eating small, frequent meals all with high lean protein  choices. Be sure to limit/cut back on daily carbohydrate intake. Discussed with the patient the recommended amount of water per day to intake- half of body weight in ounces. Reviewed vitamin requirements. Be sure to do routine exercise, 150 minutes per week minimum, including both cardio and strength training.     Instructions / Recommendations: dietary counseling recommended, recommended a daily protein intake of  grams, vitamin supplement(s) recommended, recommended exercising at least 150 minutes per week, behavior modifications recommended and instructed to call the office for concerns, questions, or problems.     The patient was instructed to follow up in 6 weeks.     The patient was counseled regarding. Total time spent during this encounter today was 30 minutes.

## 2025-06-19 NOTE — E-VISIT TREATED
Date: 2025 09:01:34  Clinician: Aniya Ordaz  Clinician NPI: [id number]  Patient: Amy Brown  Patient : 2000  Patient Address: [address] [place], [place] [address]  Patient Phone: [phone]  Visit Protocol: URI  Patient Summary:  Amy is a 25 year old ( : 2000 ) female who initiated a visit for cold, sinus infection, or influenza.     Amy states the symptoms started gradually 6 days ago. After the symptoms started, they improved and then got worse   again.   Symptom start date: 2025   The symptoms consist of nausea, rhinitis, a cough, facial pain or pressure, a headache, nasal congestion, ear pain, a sore throat, and malaise. Amy is experiencing difficulty breathing due to nasal congestion   but is not short of breath.   Symptom details     Nasal secretions: The color of the mucus is yellow, blood-tinged, and clear.    Cough: Amy coughs every 5-10 minutes and the cough is more bothersome at night. Phlegm does not come into the throat when coughing. Amy believes the cough is caused by post-nasal drip.     Sore throat: Amy reports having mild throat pain (1-3 on a 10 point pain scale), does not have exudate on the tonsils, and can swallow liquids without any difficulty. Amy is not sure if the lymph nodes in the neck are enlarged. A rash has not   appeared on the skin since the sore throat started. Patient reports feeling pain in the front of the neck that sometimes moves to the ear.     Facial pain or pressure: The facial pain or pressure feels worse when bending over or leaning forward.     Headache: The headache is mild (1-3 on a 10 point pain scale).      Amy denies having teeth pain, wheezing, vomiting, myalgias, chills, diarrhea, fever, and anosmia and ageusia. Amy also denies taking antibiotic medication in the past month, having recent facial or sinus surgery in the past 60 days, and   experiencing difficulty opening their mouth due to pain or  swelling in the jaw muscles.   Precipitating events  Within the past week, Amy has not been exposed to someone with strep throat. Amy has not recently been exposed to someone with   influenza. Amy has been in close contact with the following high risk individuals: adults 65 or older.    Amy has not received the influenza vaccination.   Pertinent COVID-19 (Coronavirus) information  Since the symptoms started, Amy has tested   for COVID-19. The result of the test was negative. The negative result   was more than 48 hours ago. Amy was not able to re-test today.   Amy has not received a COVID-19 vaccine in the past year.   Pertinent medical history     Amy had 1 sinus infection within the past year.   A provider has not told Amy to avoid   NSAIDs.   Amy does not get yeast infections when an antibiotic is taken.   Amy does not have diabetes. Amy denies having immunosuppressive conditions (e.g., chemotherapy, HIV, organ transplant, long-term use of steroids or other   immunosuppressive medications, splenectomy). Amy does not have asthma.   Amy denies having chronic lung disease, cystic fibrosis, hypertension, long-term disabilities, mental health conditions, sickle cell disease or thalassemia, stroke or other   cardiovascular disease, substance use disorders, or tuberculosis (TB).  Amy does not smoke or use smokeless tobacco. Amy does not vape or use other e-cigarette products.   Amy denies pregnancy and denies breastfeeding.   Additional information   as reported by the patient (free text): I can barely hear out of my left ear and my hearing over all of muffled like they're full. I can't stop coughing and get rid of my stuffy nose.   Weight: 250 lbs (113.4 kg)    MEDICATIONS: Flonase Allergy Relief nasal, Mucinex oral, brompheniramine-pseudoephedrine-DM oral, phentermine oral, topiramate oral, Vienva oral, ondansetron oral, hydroxyzine HCl oral, oseltamivir oral, calcium  carbonate oral, ALLERGIES:   Cephalosporins, cefdinir  Clinician Response:  Dear Amy,  Based on the information provided, you have acute bacterial sinusitis, also known as a sinus infection. Most cases of sinus infections are caused by viruses and symptoms start to improve on their own in 7-10 days. Both   viral and bacterial sinus infections usually resolve on its own. A bacterial infection may have developed if any of the following apply to you.      Symptoms of sinus infection lasting 10 days or more without showing any improvement    If you feel better after a viral upper respiratory infection such as, a cold but then suddenly feel worse with symptoms such as fever, headache, or sinus pressure     Medication information  I am prescribing:       Doxycycline hyclate 100 mg oral tablet. Take 1 tablet by mouth 2 times per day for 7 days. There are no refills with this prescription.      Promethazine-DM 6.25-15 mg/5 mL oral syrup. Take 5 mL by mouth every 4-6 hours as needed (maximum of 30 mL in 24 hours) for cough. This medication can cause drowsiness. There are no refills with this prescription.     Certain antibiotics, such as doxycycline, levofloxacin, and moxifloxacin, can cause your skin to be more sensitive to the sun. Exposure to the sun when taking these antibiotics may cause a skin rash, itching, redness in lighter skin tones,   discoloration in darker skin tones, or a severe sunburn. Be sure to avoid direct exposure to the sun, even for short periods of time, especially between 10 am and 3 pm if possible. Protective clothing from the sun and a broad-spectrum sunscreen is   recommended for all skin types when outdoors.   We recommend a water-resistant, tinted, mineral, broad-spectrum sunscreen, SPF 30 or higher, that contains iron oxide or titanium dioxide. Reapply every 2 hours. Sunscreens that also contain antioxidants   can reduce your risk of sun damage and will enhance the sunscreen's effects  providing additional protection for your skin. For more information, please visit the following link:  Sunscreen education  Yeast infections can be a common side effect of   antibiotics. The most common symptom of a yeast infection is itchiness in and around the vagina. Other signs and symptoms include burning, redness of the vulva (the outer part of the female genitals), or a thick, white vaginal discharge that looks like   cottage cheese and does not have a bad smell.  If you become pregnant during this course of treatment, stop taking the medication and contact your primary care provider.  Self care  Steps you can take to be as comfortable as possible:     Rest.    Drink plenty of fluids.    Take a warm shower to loosen congestion.    Use a cool-mist humidifier.    Use throat lozenges.    Suck on frozen items such as popsicles.    Drink hot tea with lemon and honey.    Gargle with warm salt water (1/4 teaspoon of salt per 8 ounce glass of water).    Take a spoonful of honey to reduce your cough.     When to seek care  Please be seen in a clinic or urgent care if any of the following occur:     New symptoms develop, or symptoms become worse    Symptoms do not start to improve after 3 days of treatment     Call 911 or go to the emergency room if any of the following occur:     Difficulty breathing    If you feel that your throat is closing off    Suddenly develop a rash    Unable to swallow fluids or are drooling     It is possible to have an allergic reaction to an antibiotic even if you have not had one in the past. If you notice a new rash, significant swelling, or difficulty breathing, stop taking this medication immediately and go to a clinic or urgent care.    For the latest updates on COVID-19 (Coronavirus), please visit the Centers for Disease Control and Prevention (CDC). Also, your state and local health department websites may provide additional guidance regarding testing and isolation recommendations  for   your location.   Diagnosis: Acute bacterial sinusitis  Diagnosis ICD: J01.90    Follow up instructions:  ATTENTION: If you have been prescribed medications, your prescriptions will not be sent until you choose your pharmacy. To do so open the link within your notification, or go to [facility] and click eVisit in the menu to open your   treatment plan. From there, you can select your pharmacy at the bottom of your after visit summary. You can also go to [email or web address]   Prescriptions  Prescription: promethazine-DM 6.25-15 mg/5 mL oral syrup, take 5 milliliters by mouth every 4 to 6 hours as needed for cough  Prescription: doxycycline hyclate 100 mg oral tablet, take 1 tablet by mouth 2 times per day for 7 days

## 2025-07-31 ENCOUNTER — OFFICE VISIT (OUTPATIENT)
Dept: BARIATRICS/WEIGHT MGMT | Facility: CLINIC | Age: 25
End: 2025-07-31
Payer: COMMERCIAL

## 2025-07-31 VITALS
DIASTOLIC BLOOD PRESSURE: 95 MMHG | WEIGHT: 247 LBS | BODY MASS INDEX: 41.15 KG/M2 | TEMPERATURE: 97.8 F | HEART RATE: 80 BPM | SYSTOLIC BLOOD PRESSURE: 141 MMHG | HEIGHT: 65 IN

## 2025-07-31 DIAGNOSIS — Z71.3 WEIGHT LOSS COUNSELING, ENCOUNTER FOR: ICD-10-CM

## 2025-07-31 DIAGNOSIS — E66.813 OBESITY, CLASS III, BMI 40-49.9 (MORBID OBESITY): Primary | ICD-10-CM

## 2025-07-31 DIAGNOSIS — Z71.3 DIETARY COUNSELING: ICD-10-CM

## 2025-07-31 PROCEDURE — 99214 OFFICE O/P EST MOD 30 MIN: CPT | Performed by: NURSE PRACTITIONER

## 2025-07-31 NOTE — PROGRESS NOTES
[facility] BARIATR[facility] BARIATRIC SURGERY  [address]  [facility] [id number] [phone]  [address]  [facility] [address] [phone]  Dept: [phone]  7/31/2025      Amy Brown.  [id number]  [id number]  2000  female      Chief Complaint   Patient presents with    Follow-up     Follow up [facility]        Post-Op Bariatric Medicine:   Amy Brown presents today for follow up today for provider supervised medical weight loss.    HPI:   Today's weight is 112 kg (247 lb) pounds, today's BMI is Body mass index is 40.56 kg/m²., she has a  loss of 6 pounds since the last visit.     Amy Brown denies v/d/regurg/reflux/pain and reports constipation and fatigue. Doing well with phentermine and topamax combo. Still doing well with IF, getting plenty of water and exercising routine is great.     Diet and Exercise: Diet history reviewed and discussed with the patient. Weight loss/gains to date discussed with the patient. The patient states they are eating 60-70 grams of protein per day. She reports eating 2 meals per day, a typical portion size of 1 cup, eating 2 snacks per day, drinking 5+ or more 8-oz. glasses of water per day, no carbonated beverage consumption and exercising regularly- cardio and strength 3 or more days a week.       Review of Systems   Constitutional:  Positive for fatigue.   Gastrointestinal:  Positive for constipation.   All other systems reviewed and are negative.      Patient Active Problem List   Diagnosis    Depression with anxiety    Acne vulgaris    Obesity, Class III, BMI 40-49.9 (morbid obesity)    Dietary counseling    Weight loss counseling, encounter for    GERD (gastroesophageal reflux disease)       Past Medical History:   Diagnosis Date    Allergic     Anemia     Anxiety     ASD (atrial septal defect)     closed on its own at 1 year of age    Depression     Diverticulosis 5/11    Diagnosed in ER     Headache 3/2/2021    Hiatal hernia     History of medical problems     Colitis    Obesity        The following portions of the patient's history were reviewed and updated as appropriate: allergies, current medications, past family history, past medical history, past social history, past surgical history, and problem list.    Vitals:    07/31/25 0829   BP: 141/95   Pulse: 80   Temp: 97.8 °F (36.6 °C)       Physical Exam  Vitals reviewed.   Constitutional:       Appearance: Normal appearance. She is well-developed. She is obese.   HENT:      Head: Normocephalic and atraumatic.   Cardiovascular:      Rate and Rhythm: Normal rate.   Pulmonary:      Effort: Pulmonary effort is normal.   Abdominal:      Palpations: Abdomen is soft.   Musculoskeletal:         General: Normal range of motion.   Skin:     General: Skin is warm and dry.   Neurological:      Mental Status: She is alert and oriented to person, place, and time.   Psychiatric:         Behavior: Behavior normal.         Thought Content: Thought content normal.         Judgment: Judgment normal.         Assessment:     (E66.593) Obesity, Class III, BMI 40-49.9 (morbid obesity) - Plan: Phentermine HCl 8 MG tablet    (Z71.3) Weight loss counseling, encounter for    (Z71.3) Dietary counseling     Plan:     Continue with topamax as her weight loss is good. Keep up the good work with time restricted eating and plenty of water. Combination of cardio and strength training is great.  Encouraged patient to be sure to get plenty of lean protein per day through small frequent meals all with a protein source.   Activity restrictions: none.   Recommended patient be sure to get at least 70 grams of protein per day by eating small, frequent meals all with high lean protein choices. Be sure to limit/cut back on daily carbohydrate intake. Discussed with the patient the recommended amount of water per day to intake- half of body weight in ounces. Reviewed vitamin requirements. Be  sure to do routine exercise, 150 minutes per week minimum, including both cardio and strength training.     Instructions / Recommendations: dietary counseling recommended, recommended a daily protein intake of  grams, vitamin supplement(s) recommended, recommended exercising at least 150 minutes per week, behavior modifications recommended and instructed to call the office for concerns, questions, or problems.     The patient was instructed to follow up in 6 weeks.     The patient was counseled regarding. Total time spent during this encounter today was 30 minutes.

## 2025-08-21 ENCOUNTER — TELEMEDICINE (OUTPATIENT)
Dept: FAMILY MEDICINE CLINIC | Facility: TELEHEALTH | Age: 25
End: 2025-08-21
Payer: COMMERCIAL

## 2025-08-21 ENCOUNTER — E-VISIT (OUTPATIENT)
Dept: ADMINISTRATIVE | Facility: OTHER | Age: 25
End: 2025-08-21
Payer: COMMERCIAL

## 2025-08-21 DIAGNOSIS — N39.0 URINARY TRACT INFECTION WITHOUT HEMATURIA, SITE UNSPECIFIED: Primary | ICD-10-CM

## 2025-08-21 RX ORDER — NITROFURANTOIN 25; 75 MG/1; MG/1
100 CAPSULE ORAL 2 TIMES DAILY
Qty: 10 CAPSULE | Refills: 0 | Status: SHIPPED | OUTPATIENT
Start: 2025-08-21